# Patient Record
Sex: MALE | Race: WHITE | NOT HISPANIC OR LATINO
[De-identification: names, ages, dates, MRNs, and addresses within clinical notes are randomized per-mention and may not be internally consistent; named-entity substitution may affect disease eponyms.]

---

## 2018-08-28 ENCOUNTER — RECORD ABSTRACTING (OUTPATIENT)
Age: 68
End: 2018-08-28

## 2018-08-28 DIAGNOSIS — Z95.0 PRESENCE OF CARDIAC PACEMAKER: ICD-10-CM

## 2018-08-28 DIAGNOSIS — Z87.891 PERSONAL HISTORY OF NICOTINE DEPENDENCE: ICD-10-CM

## 2018-10-02 ENCOUNTER — APPOINTMENT (OUTPATIENT)
Dept: PULMONOLOGY | Facility: CLINIC | Age: 68
End: 2018-10-02
Payer: MEDICARE

## 2018-10-02 VITALS
DIASTOLIC BLOOD PRESSURE: 71 MMHG | TEMPERATURE: 97.7 F | HEART RATE: 76 BPM | OXYGEN SATURATION: 96 % | SYSTOLIC BLOOD PRESSURE: 148 MMHG

## 2018-10-02 LAB — GLUCOSE BLDC GLUCOMTR-MCNC: 338

## 2018-10-02 PROCEDURE — 99213 OFFICE O/P EST LOW 20 MIN: CPT | Mod: 25

## 2018-10-02 PROCEDURE — 90662 IIV NO PRSV INCREASED AG IM: CPT

## 2018-10-02 PROCEDURE — 82962 GLUCOSE BLOOD TEST: CPT

## 2018-10-02 PROCEDURE — 83036 HEMOGLOBIN GLYCOSYLATED A1C: CPT | Mod: QW

## 2018-10-02 PROCEDURE — G0008: CPT

## 2018-10-03 LAB — HBA1C MFR BLD HPLC: 9.7

## 2018-10-12 ENCOUNTER — LABORATORY RESULT (OUTPATIENT)
Age: 68
End: 2018-10-12

## 2018-10-12 ENCOUNTER — APPOINTMENT (OUTPATIENT)
Dept: PULMONOLOGY | Facility: CLINIC | Age: 68
End: 2018-10-12
Payer: MEDICARE

## 2018-10-12 VITALS
SYSTOLIC BLOOD PRESSURE: 152 MMHG | OXYGEN SATURATION: 97 % | HEART RATE: 64 BPM | TEMPERATURE: 99 F | DIASTOLIC BLOOD PRESSURE: 71 MMHG | RESPIRATION RATE: 12 BRPM

## 2018-10-12 VITALS — WEIGHT: 292 LBS | HEIGHT: 71 IN | BODY MASS INDEX: 40.88 KG/M2

## 2018-10-12 DIAGNOSIS — L25.9 UNSPECIFIED CONTACT DERMATITIS, UNSPECIFIED CAUSE: ICD-10-CM

## 2018-10-12 PROCEDURE — 99215 OFFICE O/P EST HI 40 MIN: CPT | Mod: 25

## 2018-10-12 PROCEDURE — 36415 COLL VENOUS BLD VENIPUNCTURE: CPT

## 2018-10-15 LAB
25(OH)D3 SERPL-MCNC: 34.4 NG/ML
ALBUMIN SERPL ELPH-MCNC: 4.3 G/DL
ALP BLD-CCNC: 80 U/L
ALT SERPL-CCNC: 34 U/L
ANION GAP SERPL CALC-SCNC: 14 MMOL/L
AST SERPL-CCNC: 32 U/L
BASOPHILS # BLD AUTO: 0.02 K/UL
BASOPHILS NFR BLD AUTO: 0.3 %
BILIRUB DIRECT SERPL-MCNC: 0.1 MG/DL
BILIRUB INDIRECT SERPL-MCNC: 0.3 MG/DL
BILIRUB SERPL-MCNC: 0.4 MG/DL
BUN SERPL-MCNC: 20 MG/DL
CALCIUM SERPL-MCNC: 9.7 MG/DL
CHLORIDE SERPL-SCNC: 101 MMOL/L
CHOLEST SERPL-MCNC: 140 MG/DL
CHOLEST/HDLC SERPL: 4 RATIO
CO2 SERPL-SCNC: 25 MMOL/L
CREAT SERPL-MCNC: 1.01 MG/DL
EOSINOPHIL # BLD AUTO: 0.24 K/UL
EOSINOPHIL NFR BLD AUTO: 3.7 %
GLUCOSE SERPL-MCNC: 180 MG/DL
HCT VFR BLD CALC: 40.2 %
HCV AB SER QL: NONREACTIVE
HCV S/CO RATIO: 0.11 S/CO
HDLC SERPL-MCNC: 35 MG/DL
HGB BLD-MCNC: 13.3 G/DL
IMM GRANULOCYTES NFR BLD AUTO: 0.5 %
LDLC SERPL CALC-MCNC: 64 MG/DL
LYMPHOCYTES # BLD AUTO: 2.29 K/UL
LYMPHOCYTES NFR BLD AUTO: 35.6 %
MAN DIFF?: NORMAL
MCHC RBC-ENTMCNC: 29.1 PG
MCHC RBC-ENTMCNC: 33.1 GM/DL
MCV RBC AUTO: 88 FL
MONOCYTES # BLD AUTO: 0.44 K/UL
MONOCYTES NFR BLD AUTO: 6.8 %
NEUTROPHILS # BLD AUTO: 3.41 K/UL
NEUTROPHILS NFR BLD AUTO: 53.1 %
PLATELET # BLD AUTO: 209 K/UL
POTASSIUM SERPL-SCNC: 4.4 MMOL/L
PROT SERPL-MCNC: 7 G/DL
RBC # BLD: 4.57 M/UL
RBC # FLD: 14.6 %
SODIUM SERPL-SCNC: 140 MMOL/L
T3 SERPL-MCNC: 111 NG/DL
T3RU NFR SERPL: 1.01 INDEX
T4 FREE SERPL-MCNC: 1.3 NG/DL
T4 SERPL-MCNC: 7.5 UG/DL
TRIGL SERPL-MCNC: 203 MG/DL
TSH SERPL-ACNC: 2.08 UIU/ML
WBC # FLD AUTO: 6.43 K/UL

## 2018-11-10 ENCOUNTER — EMERGENCY (EMERGENCY)
Facility: HOSPITAL | Age: 68
LOS: 1 days | Discharge: HOME | End: 2018-11-10
Attending: EMERGENCY MEDICINE | Admitting: EMERGENCY MEDICINE
Payer: MEDICARE

## 2018-11-10 VITALS
DIASTOLIC BLOOD PRESSURE: 78 MMHG | RESPIRATION RATE: 16 BRPM | TEMPERATURE: 99 F | OXYGEN SATURATION: 95 % | HEART RATE: 82 BPM | SYSTOLIC BLOOD PRESSURE: 160 MMHG

## 2018-11-10 VITALS
SYSTOLIC BLOOD PRESSURE: 143 MMHG | OXYGEN SATURATION: 98 % | HEART RATE: 81 BPM | RESPIRATION RATE: 22 BRPM | DIASTOLIC BLOOD PRESSURE: 95 MMHG

## 2018-11-10 DIAGNOSIS — Z98.89 OTHER SPECIFIED POSTPROCEDURAL STATES: Chronic | ICD-10-CM

## 2018-11-10 LAB
ALBUMIN SERPL ELPH-MCNC: 4.4 G/DL — SIGNIFICANT CHANGE UP (ref 3.3–5)
ALP SERPL-CCNC: 89 U/L — SIGNIFICANT CHANGE UP (ref 40–120)
ALT FLD-CCNC: 32 U/L — SIGNIFICANT CHANGE UP (ref 4–41)
AST SERPL-CCNC: 29 U/L — SIGNIFICANT CHANGE UP (ref 4–40)
BASOPHILS # BLD AUTO: 0.05 K/UL — SIGNIFICANT CHANGE UP (ref 0–0.2)
BASOPHILS NFR BLD AUTO: 0.6 % — SIGNIFICANT CHANGE UP (ref 0–2)
BILIRUB SERPL-MCNC: 0.4 MG/DL — SIGNIFICANT CHANGE UP (ref 0.2–1.2)
BUN SERPL-MCNC: 20 MG/DL — SIGNIFICANT CHANGE UP (ref 7–23)
CALCIUM SERPL-MCNC: 9.6 MG/DL — SIGNIFICANT CHANGE UP (ref 8.4–10.5)
CHLORIDE SERPL-SCNC: 100 MMOL/L — SIGNIFICANT CHANGE UP (ref 98–107)
CO2 SERPL-SCNC: 25 MMOL/L — SIGNIFICANT CHANGE UP (ref 22–31)
CREAT SERPL-MCNC: 1.09 MG/DL — SIGNIFICANT CHANGE UP (ref 0.5–1.3)
EOSINOPHIL # BLD AUTO: 0.24 K/UL — SIGNIFICANT CHANGE UP (ref 0–0.5)
EOSINOPHIL NFR BLD AUTO: 3 % — SIGNIFICANT CHANGE UP (ref 0–6)
GLUCOSE SERPL-MCNC: 162 MG/DL — HIGH (ref 70–99)
HCT VFR BLD CALC: 44.7 % — SIGNIFICANT CHANGE UP (ref 39–50)
HGB BLD-MCNC: 14.8 G/DL — SIGNIFICANT CHANGE UP (ref 13–17)
IMM GRANULOCYTES # BLD AUTO: 0.03 # — SIGNIFICANT CHANGE UP
IMM GRANULOCYTES NFR BLD AUTO: 0.4 % — SIGNIFICANT CHANGE UP (ref 0–1.5)
LYMPHOCYTES # BLD AUTO: 3.21 K/UL — SIGNIFICANT CHANGE UP (ref 1–3.3)
LYMPHOCYTES # BLD AUTO: 40.4 % — SIGNIFICANT CHANGE UP (ref 13–44)
MCHC RBC-ENTMCNC: 29.1 PG — SIGNIFICANT CHANGE UP (ref 27–34)
MCHC RBC-ENTMCNC: 33.1 % — SIGNIFICANT CHANGE UP (ref 32–36)
MCV RBC AUTO: 88 FL — SIGNIFICANT CHANGE UP (ref 80–100)
MONOCYTES # BLD AUTO: 0.53 K/UL — SIGNIFICANT CHANGE UP (ref 0–0.9)
MONOCYTES NFR BLD AUTO: 6.7 % — SIGNIFICANT CHANGE UP (ref 2–14)
NEUTROPHILS # BLD AUTO: 3.89 K/UL — SIGNIFICANT CHANGE UP (ref 1.8–7.4)
NEUTROPHILS NFR BLD AUTO: 48.9 % — SIGNIFICANT CHANGE UP (ref 43–77)
NRBC # FLD: 0 — SIGNIFICANT CHANGE UP
PLATELET # BLD AUTO: 239 K/UL — SIGNIFICANT CHANGE UP (ref 150–400)
PMV BLD: 10.9 FL — SIGNIFICANT CHANGE UP (ref 7–13)
POTASSIUM SERPL-MCNC: 3.8 MMOL/L — SIGNIFICANT CHANGE UP (ref 3.5–5.3)
POTASSIUM SERPL-SCNC: 3.8 MMOL/L — SIGNIFICANT CHANGE UP (ref 3.5–5.3)
PROT SERPL-MCNC: 7.8 G/DL — SIGNIFICANT CHANGE UP (ref 6–8.3)
RBC # BLD: 5.08 M/UL — SIGNIFICANT CHANGE UP (ref 4.2–5.8)
RBC # FLD: 13.9 % — SIGNIFICANT CHANGE UP (ref 10.3–14.5)
SODIUM SERPL-SCNC: 142 MMOL/L — SIGNIFICANT CHANGE UP (ref 135–145)
WBC # BLD: 7.95 K/UL — SIGNIFICANT CHANGE UP (ref 3.8–10.5)
WBC # FLD AUTO: 7.95 K/UL — SIGNIFICANT CHANGE UP (ref 3.8–10.5)

## 2018-11-10 PROCEDURE — 99220: CPT

## 2018-11-10 RX ORDER — DIPHENHYDRAMINE HCL 50 MG
50 CAPSULE ORAL ONCE
Qty: 0 | Refills: 0 | Status: COMPLETED | OUTPATIENT
Start: 2018-11-10 | End: 2018-11-10

## 2018-11-10 RX ORDER — EPINEPHRINE 0.3 MG/.3ML
0.3 INJECTION INTRAMUSCULAR; SUBCUTANEOUS ONCE
Qty: 0 | Refills: 0 | Status: COMPLETED | OUTPATIENT
Start: 2018-11-10 | End: 2018-11-10

## 2018-11-10 RX ORDER — EPINEPHRINE 0.3 MG/.3ML
0.3 INJECTION INTRAMUSCULAR; SUBCUTANEOUS
Qty: 0.6 | Refills: 0
Start: 2018-11-10 | End: 2018-11-10

## 2018-11-10 RX ORDER — FAMOTIDINE 10 MG/ML
20 INJECTION INTRAVENOUS ONCE
Qty: 0 | Refills: 0 | Status: COMPLETED | OUTPATIENT
Start: 2018-11-10 | End: 2018-11-10

## 2018-11-10 RX ADMIN — FAMOTIDINE 20 MILLIGRAM(S): 10 INJECTION INTRAVENOUS at 06:32

## 2018-11-10 RX ADMIN — Medication 125 MILLIGRAM(S): at 06:25

## 2018-11-10 RX ADMIN — EPINEPHRINE 0.3 MILLIGRAM(S): 0.3 INJECTION INTRAMUSCULAR; SUBCUTANEOUS at 06:30

## 2018-11-10 RX ADMIN — Medication 50 MILLIGRAM(S): at 06:25

## 2018-11-10 NOTE — CONSULT NOTE ADULT - SUBJECTIVE AND OBJECTIVE BOX
HPI  68M h/o HTN, DM, RADHA presenting to the ED with tongue swelling. Patient reports symptoms started about 1h prior to presentation. He feels like he is unable to breath through his mouth but has no difficulty breathing through his nose. Denies SOB or feeling like his throat is closing. Voice is muffled. Tolerating secretions. No pain. This has never happened before. Was just started on an ARB/HCTZ combination a couple months ago. Feels improves after getting the epi and angioedema cocktail.     Past Medical and Surgical History:  Sleep apnea  Obesity  Syncope  Hypertension  Diabetes  S/P knee surgery    Medications  MEDICATIONS  (STANDING):    MEDICATIONS  (PRN):    Allergies  No Known Drug Allergies  Unknown Allergen - Anaphylaxis (Anaphylaxis (Severe))    Review of Systems  General: Negative except for HPI  Neurological: Negative.  Opthalmologic: Negative.  ENT: Negative except for HPI.  Respiratory: Negative.    Cardiovascular: Negative.    Gastrointestinal: Negative.    Genitourinary: Negative.    Musculoskeletal: Negative.    Dermatologic: Negative.    Endocrinology: Negative.    Hematological: Negative.    Psychiatric: Negative.      Vital signs past 24h  T(F): 98.6 (10 Nov 2018 08:33), Max: 98.6 (10 Nov 2018 08:33)  HR: 87 (10 Nov 2018 08:33) (81 - 96)  BP: 148/53 (10 Nov 2018 08:33) (143/95 - 165/75)  BP(mean): --  RR: 20 (10 Nov 2018 08:33) (20 - 22)  SpO2: 97% (10 Nov 2018 08:33) (97% - 98%)    Physical Exam  Constitutional: NAD, alert, awake  HENT:         Head: Normocephalic, atraumatic       Face: Symmetric, no lesion or mass       Nose: No external deformity, clear anteriorly       Oral cavity and oropharynx: oral tongue and FOM with severe watery edema, unable to visualize the OP, no pooling of secretions, no trismus        Voice: Muffled        Neck: Submental fullness, no TTP, trachea midline   Eyes: EOMI  Pulmonary/Chest: Breathing comfortably on room air, no stridor or stertor  Abdominal: Non-distended  Genitourinary: Not indicated  Neurological: No focal neuro deficit, CN 2-12 grossly intact  Skin: No obvious lesion or rash  Musculoskeletal: No deformities noted, full ROM in all major joints    Psychiatric: Alert, appropriate responses and attention span     Flexible Laryngoscopy  NC: no turbinate hypertrophy, no copious secretions  OC/OP: palate, uvula, tonsils, vallecula, base of tongue, posterior pharyngeal wall wnl  Supraglottis: epiglottis, AE folds, arytenoids, false vocal cords wnl  Hypopharynx: pyriform sinuses, posterior pharyngeal wall, post-cricoid area wnl  Glottis: true vocal cords with normal mobility and no lesion, edema or erythema  Subglottis: appears patent    Labs                        14.8   7.95  )-----------( 239      ( 10 Nov 2018 06:15 )             44.7     11-10    142  |  100  |  20  ----------------------------<  162<H>  3.8   |  25  |  1.09    Ca    9.6      10 Nov 2018 06:15    TPro  7.8  /  Alb  4.4  /  TBili  0.4  /  DBili  x   /  AST  29  /  ALT  32  /  AlkPhos  89  11-10    Assessment and Plan  68M with severe tongue and FOM angioedema likely 2/2 ARB, no oropharyngeal or laryngeal involvement.   -decadron 10mg q8h, benadryl 50mg IV q8h, pepcid 20mg IV q12h and redose as long as patient still has edema  -continuous pulse ox  -NPO as long as the swelling remains severe; once the swelling becomes mild may start advancing PO as tolerated   -STOP/NEVER take any ARB or ACEI from now on  -document allergies in chart including ARB/ACEI  -follow-up with PCP regarding htn management   -consider outpatient allergy follow-up  -page ORL and anesthesia STAT for airway concerns, plan for possible need for awake fiberoptic intubation if patient were to decompensate  -no need to rescope unless patient worsens  -CDU for further monitoring; if patient is admitted he will need to be admitted to medicine with continuous pulse ox  -Dr. Pisano will come see

## 2018-11-10 NOTE — ED CDU PROVIDER INITIAL DAY NOTE - MEDICAL DECISION MAKING DETAILS
Pt presented to ED with angioedema, was given benadryl, epinephrine, pepcid and steroids with improvement of symptoms. ENT performed a bedside scope, no laryngeal involvement, in CDU for observation, pt has been stable.

## 2018-11-10 NOTE — ED ADULT NURSE REASSESSMENT NOTE - NS ED NURSE REASSESS COMMENT FT1
report given to Janette RIOS in CDU at this time. Pt to be taken to CDU for further eval. No signs of respiratory distress noted. Speech remains garbled but able to make needs known. Pt states feels symptoms are improving.

## 2018-11-10 NOTE — ED CDU PROVIDER DISPOSITION NOTE - NSFOLLOWUPINSTRUCTIONS_ED_ALL_ED_FT
You have been cleared for discharge home. Please follow up with your primary care physician in the next 24-28 hours, unless otherwise directed. If your symptoms worsen in severity, please return to your nearest Emergency Department. Make sure to stop your combination blood pressure pill and take hydrochlorothiazide instead. Your PMD will assess your blood pressure medication regimen.

## 2018-11-10 NOTE — ED ADULT NURSE NOTE - CHPI ED NUR SYMPTOMS NEG
no throat itching/no nausea/no vomiting/no difficulty breathing/no rash/no difficulty swallowing/no shortness of breath/no wheezing

## 2018-11-10 NOTE — ED PROVIDER NOTE - MEDICAL DECISION MAKING DETAILS
69 y/o M with tongue swelling, no drooling or stridor at this time and airway appears intact but tongue swelling limits eval of post oropharynx.  ENT to assess airway, benadryl/pepcid/solumedrol/epi, reassess.

## 2018-11-10 NOTE — ED CDU PROVIDER INITIAL DAY NOTE - ATTENDING CONTRIBUTION TO CARE
AJM: Patient seen with PA and agree with above note. 69 y/o M with h/o HTN, DM (recently started on ARB/HCTZ combo and janumet 2 month ago) here with tongue swelling.  Pt reports sxs began about 1 hour PTA.  Pt went to bed in usual state of health, awoken by coughing and tongue swelling.  No stridor, drooling, or difficulty in  breathing.  No abd pain, n/v, rash, other swelling.  No new foods, allergens.  No h/o similar rxn in past.    In ED, he was given Epinephrine, Pepcid, Benadryl and Solumedrol with great improvement and has been resting comfortably afterward, no other symptoms or complaints. Pt notes is able to speak more clearly and tongue has decreased in size though still slightly swollen. Symptoms likely due to ARB use. pt instructed to stop this medicine. will continue to observe in CDU. re-scope by ENT. likely dc home

## 2018-11-10 NOTE — ED PROVIDER NOTE - OBJECTIVE STATEMENT
69 y/o M with h/o HTN, DM (recently started on ARB/HCTZ combo and janumet 2 month ago) here with tongue swelling.  Pt reports sxs began about 1 hour PTA.  Pt went to bed in usual state of health, awoken by coughing and tongue swelling.  No stridor, drooling, or difficulty in  breathing.  No abd pain, n/v, rash, other swelling.  No new foods, allergens.  No h/o similar rxn in past.

## 2018-11-10 NOTE — ED CDU PROVIDER DISPOSITION NOTE - CLINICAL COURSE
Patient seen with PA and agree with above note. 69 y/o M with h/o HTN, DM (recently started on ARB/HCTZ combo and janumet 2 month ago) here with tongue swelling.  Pt reports sxs began about 1 hour PTA.  Pt went to bed in usual state of health, awoken by coughing and tongue swelling.  No stridor, drooling, or difficulty in  breathing.  No abd pain, n/v, rash, other swelling.  No new foods, allergens.  No h/o similar rxn in past.    In ED, he was given Epinephrine, Pepcid, Benadryl and Solumedrol with great improvement and has been resting comfortably afterward, no other symptoms or complaints. Pt notes is able to speak more clearly and tongue has decreased in size though still slightly swollen. Symptoms likely due to ARB use. pt instructed to stop this medicine. Pt continued to improve will in CDU. scoped again by Dr. Pisano. New for [place] home with pmd followup

## 2018-11-10 NOTE — ED PROVIDER NOTE - MOUTH
no ulcers/(+)significant tongue swelling, no sublingual woody edema, no appreciable drooling or stridor, (+)hard palate visible, but rest of post oropharynx not visible on exam

## 2018-11-10 NOTE — ED ADULT NURSE NOTE - OBJECTIVE STATEMENT
Pt brought to rm 4 with history of DM, HTN, and pacemaker. Pt states he woke up a few hours ago with cough and felt tongue swelling. Pt denies any new foods, environmental changes, or allergies. Pt states he started taking new medications about 2 mos ago.  Pt denies SOB, difficulty breathing, difficulty swallowing, chest pain.  No signs of respiratory distress noted. Respirations are even and unlabored. pt able to speak full sentences. Pt tongue noted to be swollen and partially sticking out of month.  20G IV inserted into RAC. labs sent as ordered.  Will continue to monitor

## 2018-11-10 NOTE — ED ADULT TRIAGE NOTE - CHIEF COMPLAINT QUOTE
Pt arrives c/o tongue swelling - family helping to provide history as pt having difficulty speaking.  Per family symptoms began ~1hour ago, went to bed feeling fine.  No hives/pruritic rash noted, states throat is not swollen, just tongue.  Denies similar prior reactions, no known allergies.  PMHx Obesity, HTN, DM.  Rpts new medications: Irbesartan/Hctz & Janumet x2 months.  Charge RN Notified, brought to Rm 4.

## 2018-11-10 NOTE — ED CDU PROVIDER INITIAL DAY NOTE - OBJECTIVE STATEMENT
69 y/o M with h/o HTN, DM (recently started on ARB/HCTZ combo and janumet 2 month ago) here with tongue swelling.  Pt reports sxs began about 1 hour PTA.  Pt went to bed in usual state of health, awoken by coughing and tongue swelling.  No stridor, drooling, or difficulty in  breathing.  No abd pain, n/v, rash, other swelling.  No new foods, allergens.  No h/o similar rxn in past.    CDU IRENE Gibbs: 69 Y/O M PMH HTN DM recently started on ARB/HCTZ combo developed tongue swelling late last night which woke him up late last night. He was given Epinephrine, Pepcid, Benadryl and Solumedrol with great improvement and has been resting comfortably afterward, no other symptoms or complaints.

## 2018-11-10 NOTE — ED ADULT NURSE REASSESSMENT NOTE - NS ED NURSE REASSESS COMMENT FT1
On reassessment pt states he feels as if swelling is decreasing. Pt objectively does not appear to have any decrease in swelling. Pt still denies any difficulty breathing/swallowing or sob,. airway remains patent

## 2018-11-10 NOTE — ED PROVIDER NOTE - PROGRESS NOTE DETAILS
Bay FRASER: Pt evaluated by ENT - other than impressive tongue swelling, there is no edema to postoropharynx, no laryngeal edema.  Pt reports some improvement with medications.  Will obs for 6 hours, ENT to reassess.

## 2018-11-10 NOTE — ED CDU PROVIDER INITIAL DAY NOTE - PROGRESS NOTE DETAILS
AJM: pt significantly improved. re-eval by Dr. roger. pt cleared for dc home. dc with instructions to stop arb and follow up with pmd. rx for hctz sent

## 2018-11-10 NOTE — ED ADULT NURSE REASSESSMENT NOTE - NS ED NURSE REASSESS COMMENT FT1
Report received from BLANCA Morales. Angioedema still noted, unable to visualize airway. Pt denies dyspnea and other acute complaints. VSS. Call bell at bedside within reach. Wife at bedside.

## 2018-12-13 ENCOUNTER — MEDICATION RENEWAL (OUTPATIENT)
Age: 68
End: 2018-12-13

## 2018-12-24 ENCOUNTER — MEDICATION RENEWAL (OUTPATIENT)
Age: 68
End: 2018-12-24

## 2019-01-22 ENCOUNTER — APPOINTMENT (OUTPATIENT)
Dept: PULMONOLOGY | Facility: CLINIC | Age: 69
End: 2019-01-22
Payer: MEDICARE

## 2019-01-22 VITALS
RESPIRATION RATE: 12 BRPM | DIASTOLIC BLOOD PRESSURE: 70 MMHG | HEART RATE: 68 BPM | TEMPERATURE: 99.7 F | SYSTOLIC BLOOD PRESSURE: 158 MMHG | OXYGEN SATURATION: 97 %

## 2019-01-22 LAB — HBA1C MFR BLD HPLC: 8.1

## 2019-01-22 PROCEDURE — 36415 COLL VENOUS BLD VENIPUNCTURE: CPT

## 2019-01-22 PROCEDURE — 99213 OFFICE O/P EST LOW 20 MIN: CPT | Mod: 25

## 2019-01-22 PROCEDURE — 83036 HEMOGLOBIN GLYCOSYLATED A1C: CPT | Mod: QW

## 2019-01-22 NOTE — PHYSICAL EXAM
[General Appearance - Well Developed] : well developed [General Appearance - Well Nourished] : well nourished [Normal Conjunctiva] : the conjunctiva exhibited no abnormalities [Normal Oropharynx] : normal oropharynx [Jugular Venous Distention Increased] : there was no jugular-venous distention [Heart Sounds] : normal S1 and S2 [Murmurs] : no murmurs present [Auscultation Breath Sounds / Voice Sounds] : lungs were clear to auscultation bilaterally [Lungs Percussion] : the lungs were normal to percussion [Abdomen Soft] : soft [Abdomen Tenderness] : non-tender [Abdomen Mass (___ Cm)] : no abdominal mass palpated [Nail Clubbing] : no clubbing of the fingernails [Cyanosis, Localized] : no localized cyanosis [Petechial Hemorrhages (___cm)] : no petechial hemorrhages [] : no ischemic changes [FreeTextEntry2] : Pulses 1-2 plus [FreeTextEntry1] : Improved dermatitis.

## 2019-01-22 NOTE — PROCEDURE
[FreeTextEntry1] : hem aic 8.1 down from 9.7. still elevated\par \par BP elevated poor compliance to meds.

## 2019-01-22 NOTE — HISTORY OF PRESENT ILLNESS
[FreeTextEntry1] : Doing well on CPAP.\par \par Diet so/so.\par \par had endo changed medication for sugar, cant remember name\par

## 2019-01-25 LAB
ALBUMIN SERPL ELPH-MCNC: 4.3 G/DL
ALP BLD-CCNC: 85 U/L
ALT SERPL-CCNC: 36 U/L
ANION GAP SERPL CALC-SCNC: 13 MMOL/L
AST SERPL-CCNC: 28 U/L
BILIRUB SERPL-MCNC: 0.3 MG/DL
BUN SERPL-MCNC: 22 MG/DL
CALCIUM SERPL-MCNC: 10.3 MG/DL
CHLORIDE SERPL-SCNC: 97 MMOL/L
CHOLEST SERPL-MCNC: 159 MG/DL
CHOLEST/HDLC SERPL: 4.3 RATIO
CO2 SERPL-SCNC: 28 MMOL/L
CREAT SERPL-MCNC: 1.05 MG/DL
GLUCOSE SERPL-MCNC: 167 MG/DL
HDLC SERPL-MCNC: 37 MG/DL
LDLC SERPL CALC-MCNC: 79 MG/DL
POTASSIUM SERPL-SCNC: 4.2 MMOL/L
PROT SERPL-MCNC: 7.9 G/DL
SODIUM SERPL-SCNC: 138 MMOL/L
TRIGL SERPL-MCNC: 216 MG/DL

## 2019-04-16 ENCOUNTER — APPOINTMENT (OUTPATIENT)
Dept: PULMONOLOGY | Facility: CLINIC | Age: 69
End: 2019-04-16
Payer: MEDICARE

## 2019-04-16 VITALS
WEIGHT: 290 LBS | HEIGHT: 71 IN | DIASTOLIC BLOOD PRESSURE: 72 MMHG | OXYGEN SATURATION: 98 % | SYSTOLIC BLOOD PRESSURE: 158 MMHG | BODY MASS INDEX: 40.6 KG/M2 | HEART RATE: 68 BPM | TEMPERATURE: 98.4 F | RESPIRATION RATE: 16 BRPM

## 2019-04-16 VITALS — DIASTOLIC BLOOD PRESSURE: 76 MMHG | SYSTOLIC BLOOD PRESSURE: 136 MMHG

## 2019-04-16 PROCEDURE — 99213 OFFICE O/P EST LOW 20 MIN: CPT

## 2019-04-16 NOTE — ASSESSMENT
[FreeTextEntry1] : Cont cpap\par Bring chip\par Weight loss recommended and discussed.\par Medications reviewed and renewed.\par

## 2019-04-16 NOTE — HISTORY OF PRESENT ILLNESS
[FreeTextEntry1] : saw endo hem aic better\par \par saw cardiologist and added Bystolic \par \par using cpap nightly and machine is going to be 5 years old

## 2019-04-16 NOTE — PHYSICAL EXAM
[General Appearance - Well Nourished] : well nourished [General Appearance - Well Developed] : well developed [Normal Conjunctiva] : the conjunctiva exhibited no abnormalities [Normal Oropharynx] : normal oropharynx [Jugular Venous Distention Increased] : there was no jugular-venous distention [Murmurs] : no murmurs present [Heart Sounds] : normal S1 and S2 [Lungs Percussion] : the lungs were normal to percussion [Auscultation Breath Sounds / Voice Sounds] : lungs were clear to auscultation bilaterally [Abdomen Soft] : soft [Abdomen Tenderness] : non-tender [Abdomen Mass (___ Cm)] : no abdominal mass palpated [Cyanosis, Localized] : no localized cyanosis [Petechial Hemorrhages (___cm)] : no petechial hemorrhages [Nail Clubbing] : no clubbing of the fingernails [] : no ischemic changes [FreeTextEntry2] : Pulses 1-2 plus

## 2019-04-18 ENCOUNTER — MEDICATION RENEWAL (OUTPATIENT)
Age: 69
End: 2019-04-18

## 2019-06-10 ENCOUNTER — APPOINTMENT (OUTPATIENT)
Dept: PULMONOLOGY | Facility: CLINIC | Age: 69
End: 2019-06-10
Payer: MEDICARE

## 2019-06-10 PROCEDURE — 95800 SLP STDY UNATTENDED: CPT | Mod: 52

## 2019-06-11 PROCEDURE — 95800 SLP STDY UNATTENDED: CPT | Mod: 52

## 2019-06-22 ENCOUNTER — EMERGENCY (EMERGENCY)
Facility: HOSPITAL | Age: 69
LOS: 1 days | Discharge: HOME | End: 2019-06-22
Attending: EMERGENCY MEDICINE | Admitting: EMERGENCY MEDICINE
Payer: MEDICARE

## 2019-06-22 VITALS
OXYGEN SATURATION: 100 % | RESPIRATION RATE: 18 BRPM | DIASTOLIC BLOOD PRESSURE: 71 MMHG | HEART RATE: 103 BPM | SYSTOLIC BLOOD PRESSURE: 165 MMHG

## 2019-06-22 DIAGNOSIS — Z98.89 OTHER SPECIFIED POSTPROCEDURAL STATES: Chronic | ICD-10-CM

## 2019-06-22 LAB
ALBUMIN SERPL ELPH-MCNC: 4.7 G/DL — SIGNIFICANT CHANGE UP (ref 3.3–5)
ALP SERPL-CCNC: 81 U/L — SIGNIFICANT CHANGE UP (ref 40–120)
ALT FLD-CCNC: 42 U/L — HIGH (ref 4–41)
ANION GAP SERPL CALC-SCNC: 14 MMO/L — SIGNIFICANT CHANGE UP (ref 7–14)
AST SERPL-CCNC: 33 U/L — SIGNIFICANT CHANGE UP (ref 4–40)
BASOPHILS # BLD AUTO: 0.05 K/UL — SIGNIFICANT CHANGE UP (ref 0–0.2)
BASOPHILS NFR BLD AUTO: 0.6 % — SIGNIFICANT CHANGE UP (ref 0–2)
BILIRUB SERPL-MCNC: 0.3 MG/DL — SIGNIFICANT CHANGE UP (ref 0.2–1.2)
BUN SERPL-MCNC: 23 MG/DL — SIGNIFICANT CHANGE UP (ref 7–23)
CALCIUM SERPL-MCNC: 10 MG/DL — SIGNIFICANT CHANGE UP (ref 8.4–10.5)
CHLORIDE SERPL-SCNC: 105 MMOL/L — SIGNIFICANT CHANGE UP (ref 98–107)
CO2 SERPL-SCNC: 25 MMOL/L — SIGNIFICANT CHANGE UP (ref 22–31)
CREAT SERPL-MCNC: 1.06 MG/DL — SIGNIFICANT CHANGE UP (ref 0.5–1.3)
EOSINOPHIL # BLD AUTO: 0.29 K/UL — SIGNIFICANT CHANGE UP (ref 0–0.5)
EOSINOPHIL NFR BLD AUTO: 3.4 % — SIGNIFICANT CHANGE UP (ref 0–6)
GLUCOSE SERPL-MCNC: 192 MG/DL — HIGH (ref 70–99)
HCT VFR BLD CALC: 42.1 % — SIGNIFICANT CHANGE UP (ref 39–50)
HGB BLD-MCNC: 14 G/DL — SIGNIFICANT CHANGE UP (ref 13–17)
IMM GRANULOCYTES NFR BLD AUTO: 0.7 % — SIGNIFICANT CHANGE UP (ref 0–1.5)
LYMPHOCYTES # BLD AUTO: 2.45 K/UL — SIGNIFICANT CHANGE UP (ref 1–3.3)
LYMPHOCYTES # BLD AUTO: 29.1 % — SIGNIFICANT CHANGE UP (ref 13–44)
MCHC RBC-ENTMCNC: 28.3 PG — SIGNIFICANT CHANGE UP (ref 27–34)
MCHC RBC-ENTMCNC: 33.3 % — SIGNIFICANT CHANGE UP (ref 32–36)
MCV RBC AUTO: 85.1 FL — SIGNIFICANT CHANGE UP (ref 80–100)
MONOCYTES # BLD AUTO: 0.6 K/UL — SIGNIFICANT CHANGE UP (ref 0–0.9)
MONOCYTES NFR BLD AUTO: 7.1 % — SIGNIFICANT CHANGE UP (ref 2–14)
NEUTROPHILS # BLD AUTO: 4.97 K/UL — SIGNIFICANT CHANGE UP (ref 1.8–7.4)
NEUTROPHILS NFR BLD AUTO: 59.1 % — SIGNIFICANT CHANGE UP (ref 43–77)
NRBC # FLD: 0 K/UL — SIGNIFICANT CHANGE UP (ref 0–0)
PLATELET # BLD AUTO: 216 K/UL — SIGNIFICANT CHANGE UP (ref 150–400)
PMV BLD: 10.1 FL — SIGNIFICANT CHANGE UP (ref 7–13)
POTASSIUM SERPL-MCNC: 4 MMOL/L — SIGNIFICANT CHANGE UP (ref 3.5–5.3)
POTASSIUM SERPL-SCNC: 4 MMOL/L — SIGNIFICANT CHANGE UP (ref 3.5–5.3)
PROT SERPL-MCNC: 8.2 G/DL — SIGNIFICANT CHANGE UP (ref 6–8.3)
RBC # BLD: 4.95 M/UL — SIGNIFICANT CHANGE UP (ref 4.2–5.8)
RBC # FLD: 14.4 % — SIGNIFICANT CHANGE UP (ref 10.3–14.5)
SODIUM SERPL-SCNC: 144 MMOL/L — SIGNIFICANT CHANGE UP (ref 135–145)
WBC # BLD: 8.42 K/UL — SIGNIFICANT CHANGE UP (ref 3.8–10.5)
WBC # FLD AUTO: 8.42 K/UL — SIGNIFICANT CHANGE UP (ref 3.8–10.5)

## 2019-06-22 PROCEDURE — 99218: CPT | Mod: GC

## 2019-06-22 RX ORDER — EPINEPHRINE 0.3 MG/.3ML
0.3 INJECTION INTRAMUSCULAR; SUBCUTANEOUS ONCE
Refills: 0 | Status: DISCONTINUED | OUTPATIENT
Start: 2019-06-22 | End: 2019-06-22

## 2019-06-22 RX ORDER — DIPHENHYDRAMINE HCL 50 MG
38 CAPSULE ORAL ONCE
Refills: 0 | Status: COMPLETED | OUTPATIENT
Start: 2019-06-22 | End: 2019-06-22

## 2019-06-22 RX ORDER — FAMOTIDINE 10 MG/ML
20 INJECTION INTRAVENOUS ONCE
Refills: 0 | Status: COMPLETED | OUTPATIENT
Start: 2019-06-22 | End: 2019-06-22

## 2019-06-22 RX ORDER — SERTRALINE 25 MG/1
100 TABLET, FILM COATED ORAL DAILY
Refills: 0 | Status: DISCONTINUED | OUTPATIENT
Start: 2019-06-22 | End: 2019-06-26

## 2019-06-22 RX ORDER — FINASTERIDE 5 MG/1
5 TABLET, FILM COATED ORAL DAILY
Refills: 0 | Status: DISCONTINUED | OUTPATIENT
Start: 2019-06-22 | End: 2019-06-26

## 2019-06-22 RX ORDER — SODIUM CHLORIDE 9 MG/ML
1000 INJECTION INTRAMUSCULAR; INTRAVENOUS; SUBCUTANEOUS ONCE
Refills: 0 | Status: COMPLETED | OUTPATIENT
Start: 2019-06-22 | End: 2019-06-22

## 2019-06-22 RX ORDER — EPINEPHRINE 0.3 MG/.3ML
0.3 INJECTION INTRAMUSCULAR; SUBCUTANEOUS ONCE
Refills: 0 | Status: COMPLETED | OUTPATIENT
Start: 2019-06-22 | End: 2019-06-22

## 2019-06-22 RX ADMIN — Medication 125 MILLIGRAM(S): at 19:54

## 2019-06-22 RX ADMIN — EPINEPHRINE 0.3 MILLIGRAM(S): 0.3 INJECTION INTRAMUSCULAR; SUBCUTANEOUS at 21:54

## 2019-06-22 RX ADMIN — Medication 38 MILLIGRAM(S): at 19:56

## 2019-06-22 RX ADMIN — SODIUM CHLORIDE 2000 MILLILITER(S): 9 INJECTION INTRAMUSCULAR; INTRAVENOUS; SUBCUTANEOUS at 19:55

## 2019-06-22 RX ADMIN — FAMOTIDINE 20 MILLIGRAM(S): 10 INJECTION INTRAVENOUS at 19:54

## 2019-06-22 NOTE — ED PROVIDER NOTE - CLINICAL SUMMARY MEDICAL DECISION MAKING FREE TEXT BOX
angioedema vs anaphylaxis, no clear trigger or family hx, will treat with pepcid/benadryl/steroid/consider epi, hydrate, have ENT scope airway, likely CDU

## 2019-06-22 NOTE — ED ADULT NURSE NOTE - NSIMPLEMENTINTERV_GEN_ALL_ED
Implemented All Universal Safety Interventions:  [place] to call system. Call bell, personal items and telephone within reach. Instruct patient to call for assistance. Room bathroom lighting operational. Non-slip footwear when patient is off stretcher. Physically safe environment: no spills, clutter or unnecessary equipment. Stretcher in lowest position, wheels locked, appropriate side rails in place.

## 2019-06-22 NOTE — ED PROVIDER NOTE - PHYSICAL EXAMINATION
Gen: NAD, AOx3  Head: NCAT  HEENT: PERRL, oral mucosa moist, normal conjunctiva, left side tongue swelling, left side lip swelling, no posterior pharynx swelling visible  Lung: CTAB, no respiratory distress  CV: rrr, no murmurs, Normal perfusion  Abd: soft, NTND, no CVA tenderness  MSK: No edema, no visible deformities  Neuro: No focal neurologic deficits  Skin: No rash   Psych: normal affect

## 2019-06-22 NOTE — ED CDU PROVIDER INITIAL DAY NOTE - MEDICAL DECISION MAKING DETAILS
A:  -Lingual edema  P:  -ENT scope showed no upper airway edema beyond tongue, redose steroids q6h, redose benadryl q8h, redose pepcid q12h

## 2019-06-22 NOTE — CONSULT NOTE ADULT - SUBJECTIVE AND OBJECTIVE BOX
HPI: 69M with PMH anaphylaxis/angioedema, RADHA, DM, HTN presenting with 1-2 hours of tongue swelling after eating a chocolate bar today, No recent med changes. Had similar episode one year ago after changing BP med, but then switched back to his old med (ARB/chlorthalidone). Patient did not follow up with allergist or get epipen. No family history of angioedema. Does not have trouble breathing or difficulty swallowing. Muffled voice. On RA maintaining sats.    T(C): --  HR: 96 (06-22-19 @ 19:47) (96 - 103)  BP: 148/69 (06-22-19 @ 19:47) (148/69 - 165/71)  RR: 18 (06-22-19 @ 19:47) (18 - 18)  SpO2: 98% (06-22-19 @ 19:47) (98% - 100%)  NAD, AOx3  No respiratory distress, stridor, stertor on RA  Voice quality: muffled  PERRL, EOMI  Nose: bilateral NC clear anteriorly, IT normal, mucosa normal  OC/OP: tongue and FOM soft with moderate angiodema, no lesions, OP clear, minimal uvular edema  Neck: soft and flat, no LAD  CN II-XII grossly intact    FOE: NC/NP: bilateral IT and MT normal in appearance. mucosa normal, no lesions. NP clear  OP: BOT and tonsils normal, no lesions. Mucosa normal.  Suprglottis: Epiglottis, AE folds, Arytenoids all sharp without edema. Mucosa normal, no lesions.  Glottis: TVC fully mobile bilaterally, no lesions, airway grossly patent.  Subglottis: clear, no lesions  Hypopharynx: No pooling of secretions, mucosa normal, no lesions.    A/P: 69M with angioedema isolated to the tongue and FOM. No involvement of the airway at this time.  - no acute ENT intervention  - monitor tongue and FOM for resolution of swelling  - redose steroids q6h  - redose benadryl q8h  - redose pepcid q12h  - as needed until swelling resolves  - patient can be admitted to medicine service if swelling persists beyond observation period allowed in ED  - follow up with allergy outpatient  - discussed case with attending  - please page with questions

## 2019-06-22 NOTE — ED CDU PROVIDER INITIAL DAY NOTE - ATTENDING CONTRIBUTION TO CARE
Dr. Toro: I have personally seen and examined this patient at the bedside. I have fully participated in the care of this patient. I have reviewed all pertinent clinical information, including history, physical exam, plan and the Resident's note and agree except as noted. HPI above as by me. PE above as by me. DDX angioedema PLAN epi given, cdu for airway observation Dr. Toro: I performed a face to face bedside interview with patient regarding history of present illness, review of symptoms and past medical history. I completed an independent physical exam.  I have discussed patient's plan of care with PA.   I agree with note as stated above, having amended the EMR as needed to reflect my findings.   This includes HISTORY OF PRESENT ILLNESS, HIV, PAST MEDICAL/SURGICAL/FAMILY/SOCIAL HISTORY, ALLERGIES AND HOME MEDICATIONS, REVIEW OF SYSTEMS, PHYSICAL EXAM, and any PROGRESS NOTES during the time I functioned as the attending physician for this patient. HPI above as by me. PE above as by me. DDX angioedema PLAN epi given, cdu for airway observation

## 2019-06-22 NOTE — ED PROVIDER NOTE - OBJECTIVE STATEMENT
Patient is 69yM with PMH anaphylaxis/angioedema, jared, DM, htn presenting with 1-2 hours of left tongue, left lip, left neck swelling s/p eating a chocolate bar today, No recent med changes, No new detergents/soaps/meds/creams/foods/etc   Had similar episode in the past, after changing BP med, but then switched back to his old med (ARB/chlorthalidone) and is still on this, didn't follow up with allergist or get epipen  No family history of angioedema   Does not have trouble breathing or difficulty swallowing      ROS: Denies fever, palpitations, chills, recent sickness, HA, vision changes, cough, SOB, chest pain, abdominal pain, n/v/d/c, dysuria, hematuria, rash, new joint aches, sick contacts, and recent travel. Patient is 69yM with PMH anaphylaxis/angioedema, jared, DM, htn presenting with 1-2 hours of left tongue, left lip, left neck swelling s/p eating a chocolate bar today, No recent med changes, No new detergents/soaps/meds/creams/foods/etc. Had similar episode in the past, after changing BP med, but then switched back to . His old med (ARB/chlorthalidone) and is still on this, didn't follow up with allergist or get epipen. No family history of angioedema. Does not have trouble breathing or difficulty swallowing.    ROS: Denies fever, palpitations, chills, recent sickness, HA, vision changes, cough, SOB, chest pain, abdominal pain, n/v/d/c, dysuria, hematuria, rash, new joint aches, sick contacts, and recent travel. Patient is 69yM with PMH ppm, anaphylaxis/angioedema, jared, DM, htn presenting with 1-2 hours of left tongue, left lip, left neck swelling s/p eating a chocolate bar today, No recent med changes, No new detergents/soaps/meds/creams/foods/etc. Had similar episode in the past, after changing BP med, but then switched back to . His old med (ARB/chlorthalidone) and is still on this, didn't follow up with allergist or get epipen. No family history of angioedema. Does not have trouble breathing or difficulty swallowing.    ROS: Denies fever, palpitations, chills, recent sickness, HA, vision changes, cough, SOB, chest pain, abdominal pain, n/v/d/c, dysuria, hematuria, rash, new joint aches, sick contacts, and recent travel. Patient is 69yM with PMH ppm, anaphylaxis/angioedema, jared, DM, htn presenting with 1-2 hours of left tongue, left lip, left neck swelling s/p eating a chocolate bar today, No recent med changes, No new detergents/soaps/meds/creams/foods/etc. Had similar episode in the past, after changing BP med, but then switched back to . His old med (ARB/chlorthalidone) and is still on this, didn't follow up with allergist or get epipen. No family history of angioedema. Does not have trouble breathing or difficulty swallowing.    ROS: Denies fever, palpitations, chills, recent sickness, HA, vision changes, cough, SOB, chest pain, abdominal pain, n/v/d/c, dysuria, hematuria, rash, new joint aches, sick contacts, and recent travel.    20:21 Muna att: 69M p/w tongue swelling x 1-2 hours. One to two hour prior patient first noted left sided tongue, lip and neck swelling. Reports voice changes and lip swelling. Denies dyspnea, throat tightness, cough, or wheeze. Off arb.

## 2019-06-22 NOTE — ED CDU PROVIDER INITIAL DAY NOTE - OBJECTIVE STATEMENT
ED Provider Note: Authored by Resident Dr. Young, Patient is 69yM with PMH ppm, anaphylaxis/angioedema, jared, DM, htn presenting with 1-2 hours of left tongue, left lip, left neck swelling s/p eating a chocolate bar today, No recent med changes, No new detergents/soaps/meds/creams/foods/etc. Had similar episode in the past, after changing BP med, but then switched back to . His old med (ARB/chlorthalidone) and is still on this, didn't follow up with allergist or get epipen. No family history of angioedema. Does not have trouble breathing or difficulty swallowing.  ROS: Denies fever, palpitations, chills, recent sickness, HA, vision changes, cough, SOB, chest pain, abdominal pain, n/v/d/c, dysuria, hematuria, rash, new joint aches, sick contacts, and recent travel. 20:21 Toro att: 69M p/w tongue swelling x 1-2 hours. One to two hour prior patient first noted left sided tongue, lip and neck swelling. Reports voice changes and lip swelling. Denies dyspnea, throat tightness, cough, or wheeze. Off arb.  CDU Initial Day Note: IRENE Shaffer, Agree w/ above, pt received subq epi @ 21:43, states he is feeling much better after epi, talking in full sentences, pt still w/ lingual edema; however, no dyspnea, angioedema, sob, cough, drooling, stridor, wheezing.

## 2019-06-22 NOTE — ED ADULT NURSE NOTE - OBJECTIVE STATEMENT
pt received in trA alert and oriented x 3. pt c/o tongue swelling and states he ate a chocolate bar an hour ago. pt also states similar symptoms happened in the past without known cause. tongue swelling noted and garbled speech. no drooling noted. pt denies SOB, chest pain, rash, itching, fevers, chills, n/v. airway is patent, respirations even and unlabored, lung sounds clear bilaterally. pulses present x 4 extremities. pacemaker noted to left chest wall. paced rhythm on monitor.

## 2019-06-22 NOTE — ED PROVIDER NOTE - ATTENDING CONTRIBUTION TO CARE
Dr. Toro: I have personally seen and examined this patient at the bedside. I have fully participated in the care of this patient. I have reviewed all pertinent clinical information, including history, physical exam, plan and the Resident's note and agree except as noted. HPI above as by me. PE above as by me. Angioedema, low threshold for epi, ent to scope PLAN cdu

## 2019-06-22 NOTE — ED PROVIDER NOTE - PROGRESS NOTE DETAILS
Reassessed patient, continues to have no breathing issues and no sensation of throat closing, now however R side of tongue is swelling so will administer IM epi -SM Patient's sensation of tongue swelling is improved and his voice is clinically improved, no tachycardia after epi, will send to CDU for overnight monitoring -SM

## 2019-06-22 NOTE — ED ADULT NURSE REASSESSMENT NOTE - NS ED NURSE REASSESS COMMENT FT1
Pt reports tongue swelling remains, speech is mildly muffled, resps unlabored, o2sat maintained.  Pt advised to remain on CM and pulse ox, awaiting CDU Eval following addl meds.  VSS, medicated as ordered.  Rpt handoff to primary RN Yoanna.

## 2019-06-22 NOTE — ED ADULT TRIAGE NOTE - CHIEF COMPLAINT QUOTE
Patient brought to ER from home by EMS for left side tongue swelling about an hour ago. It is affecting his speech and he has hx of anaphylaxis of some unknown.

## 2019-06-23 VITALS
SYSTOLIC BLOOD PRESSURE: 144 MMHG | HEART RATE: 74 BPM | DIASTOLIC BLOOD PRESSURE: 66 MMHG | TEMPERATURE: 98 F | RESPIRATION RATE: 16 BRPM | OXYGEN SATURATION: 100 %

## 2019-06-23 PROCEDURE — 99217: CPT | Mod: GC

## 2019-06-23 RX ORDER — AMLODIPINE BESYLATE 2.5 MG/1
10 TABLET ORAL DAILY
Refills: 0 | Status: DISCONTINUED | OUTPATIENT
Start: 2019-06-22 | End: 2019-06-23

## 2019-06-23 RX ORDER — FAMOTIDINE 10 MG/ML
1 INJECTION INTRAVENOUS
Qty: 10 | Refills: 0
Start: 2019-06-23 | End: 2019-06-27

## 2019-06-23 RX ORDER — FAMOTIDINE 10 MG/ML
20 INJECTION INTRAVENOUS ONCE
Refills: 0 | Status: COMPLETED | OUTPATIENT
Start: 2019-06-23 | End: 2019-06-23

## 2019-06-23 RX ORDER — AMLODIPINE BESYLATE 2.5 MG/1
10 TABLET ORAL ONCE
Refills: 0 | Status: COMPLETED | OUTPATIENT
Start: 2019-06-23 | End: 2019-06-23

## 2019-06-23 RX ORDER — EPINEPHRINE 0.3 MG/.3ML
0.3 INJECTION INTRAMUSCULAR; SUBCUTANEOUS
Qty: 1 | Refills: 0
Start: 2019-06-23

## 2019-06-23 RX ADMIN — AMLODIPINE BESYLATE 10 MILLIGRAM(S): 2.5 TABLET ORAL at 07:09

## 2019-06-23 RX ADMIN — FAMOTIDINE 20 MILLIGRAM(S): 10 INJECTION INTRAVENOUS at 08:49

## 2019-06-23 RX ADMIN — Medication 40 MILLIGRAM(S): at 08:49

## 2019-06-23 NOTE — ED CDU PROVIDER DISPOSITION NOTE - ATTENDING CONTRIBUTION TO CARE
Dr Bloch, ATTENDING MD-  I performed a face to face bedside interview with patient regarding history of present illness, review of symptoms and past medical history. I completed an independent physical exam.  I have discussed patient's plan of care with PA.   Documentation as above in the note.  Patient feeling well stable airway, HEENT nml lungs clear abd soft nontender. Okay for discharge

## 2019-06-23 NOTE — ED CDU PROVIDER SUBSEQUENT DAY NOTE - PROGRESS NOTE DETAILS
Patient resting comfortable in NAD, VSS, pt w/ minimal lingual edema presently, will continue to obs and document any acute interval changes. Patient resting comfortably, received at sign out from IRENE Shaffer. Pt sx improved. Discussed with ENT and state no further intervention from them. Pt maintaining airway and secretions, tolerating PO. Pt stable for dc home with steroids, pepcid and epi-pen. Pt advised to dc his arb and f/u with PCP and allergist. Pt understood and agreed with plan. All question and concerns addressed. Strict return instructions given. No active angioedema- airway patent- okay for discharge

## 2019-06-23 NOTE — ED CDU PROVIDER DISPOSITION NOTE - PLAN OF CARE
Patient advised to follow up with PRIMARY CARE DOCTOR IN 1-2 DAYS AND ALLERGIST WITHIN WEEK  and told to return to the emergency department immediately for any new or concerning symptoms such as worsening swelling, chest pain, shortness of breath, tightness OR ANY OTHER COMPLAINTS. Patient agrees with plan.    -Take prednisone daily   -Take pepcid twice a day   -Use epi-pen as needed for severe reaction see sheet attached   -STOP TAKING LOSARTAN BLOOD PRESSURE MEDICATION  -STAY AWAY FROM CHOCOLATE UNTIL FOLLOW UP WITH ALLERGIST   -Monitor your blood sugar at home  -Stay well hydrated   RETURN IF ANY NEW OR WORSENING SYMPTOMS

## 2019-06-23 NOTE — ED CDU PROVIDER DISPOSITION NOTE - CLINICAL COURSE
Patient is a 69yM with PMH ppm, anaphylaxis/angioedema, jared, DM, htn who presented to ED c/o left tongue, left lip, left neck swelling s/p eating a chocolate bar. Pt seen and worked up in ED for angioedema/allergic reaction given epi, solumedrol, pepcid and benadryl. Pt seen by ENT scope WNL no edema. Pt transferred to CDU for; IV steroids, pepcid, benadryl prn, vitals q4, airway monitoring and frequent re-evals. While in CDU no acute events, patient stable, Vitals stable, sx improved. Pt maintaining secretions, tolerating PO, discussed with ENT this am, no ent re-eval or intervention at this time. Pt stable for dc home and outpatient f/u with PCP in 1-2 days and allergist within week. Pt aware to stay away from chocolate and stop losartan until f/u with specialist. Pt given rx for steroid and pepcid and epi- pen. Aware to monitor glucose. All questions and concerns addressed. Strict return instructions given.

## 2019-06-23 NOTE — ED CDU PROVIDER DISPOSITION NOTE - NSFOLLOWUPCLINICS_GEN_ALL_ED_FT
[facility]  Allergy  [address] [place] [address]  Phone: [phone]  Fax:   Follow Up Time: 1-3 Days

## 2019-06-23 NOTE — ED CDU PROVIDER SUBSEQUENT DAY NOTE - MEDICAL DECISION MAKING DETAILS
69yM with PMH ppm, anaphylaxis/angioedema, jared, DM, htn presenting with 1-2 hours of left tongue, left lip, left neck swelling s/p eating a chocolate bar today. Pt seen in ED given Epi, steroid, pepcid, benadryl, seen by ENT.  Transferred to CDU for continued moniotoring of allergic rxn, steroids, pepcid, benadryl prn, vitals q4 and frquent re-eval.

## 2019-06-23 NOTE — ED CDU PROVIDER DISPOSITION NOTE - CARE PROVIDER_API CALL
Clinton Oliveros)  Internal Medicine; Pulmonary Disease  [address], [address]  [place] [address]  Phone: [phone]  Fax: [phone]  Follow Up Time: 1-3 Days

## 2019-06-23 NOTE — ED CDU PROVIDER SUBSEQUENT DAY NOTE - ATTENDING CONTRIBUTION TO CARE
Dr Bloch, ATTENDING MD-  I performed a face to face bedside interview with patient regarding history of present illness, review of symptoms and past medical history. I completed an independent physical exam.  I have discussed patient's plan of care with PA.   Documentation as above in the note.  Patient examined , well appearing NAD heent airway patent, tongue with no swelling, uvula nml, no lip swelling, voice nml, lungs clear, abd soft nontender, slight flushed facies, but no rashes, Heart sounds nml nonfocal neuro exam

## 2019-06-23 NOTE — ED CDU PROVIDER SUBSEQUENT DAY NOTE - PHYSICAL EXAMINATION
Vital signs reviewed.   CONSTITUTIONAL: Well-appearing; well-nourished; in no apparent distress. Non-toxic appearing.   HEAD: Normocephalic, atraumatic.  EYES: PERRL, EOM intact, conjunctiva and sclera WNL.  ENT: normal nose; no rhinorrhea; normal pharynx with no tonsillar hypertrophy, no erythema, no exudate, no lymphadenopathy. Airway patent, no tongue elevation, tongue swelling improved, uvula midline. Pt speaking in full sentences. No drooling, Pt maintaining secretions.   NECK: Trachea midline   CARD: Normal S1, S2  RESP: NAD  EXT/MS: moves all extremities;  SKIN: Normal for age and race  NEURO: Awake, alert, oriented x 3, no gross deficits  PSYCH: Normal mood; appropriate affect.

## 2019-06-23 NOTE — ED CDU PROVIDER SUBSEQUENT DAY NOTE - HISTORY
As per initial CDU note: Patient is "69yM with PMH ppm, anaphylaxis/angioedema, jared, DM, htn presenting with 1-2 hours of left tongue, left lip, left neck swelling s/p eating a chocolate bar today". Pt also noted to be on ARB- Losartan. Pt seen and worked up in ED; ENT scoped patient no edema noted, only tongue swelling. Pt transferred to CDU for; Steroids, pepcid, airway/sx monitoring, vitals q4 and frequent re-evals. This am, patient feeling much better, states tongue swelling improved, tolerating PO, denies drooling, change in voice, chest pain. sob, tightness or any other complaints.

## 2019-06-25 ENCOUNTER — APPOINTMENT (OUTPATIENT)
Dept: PEDIATRIC ALLERGY IMMUNOLOGY | Facility: CLINIC | Age: 69
End: 2019-06-25
Payer: MEDICARE

## 2019-06-25 VITALS
BODY MASS INDEX: 40.56 KG/M2 | DIASTOLIC BLOOD PRESSURE: 63 MMHG | HEART RATE: 60 BPM | SYSTOLIC BLOOD PRESSURE: 134 MMHG | WEIGHT: 290.79 LBS

## 2019-06-25 PROCEDURE — 99204 OFFICE O/P NEW MOD 45 MIN: CPT | Mod: 25

## 2019-06-25 PROCEDURE — 95004 PERQ TESTS W/ALRGNC XTRCS: CPT

## 2019-06-25 RX ORDER — NEBIVOLOL HYDROCHLORIDE 5 MG/1
5 TABLET ORAL
Refills: 0 | Status: DISCONTINUED | COMMUNITY
Start: 2019-04-16 | End: 2019-06-25

## 2019-06-25 RX ORDER — HYDROCHLOROTHIAZIDE 25 MG/1
25 TABLET ORAL
Refills: 0 | Status: ACTIVE | COMMUNITY

## 2019-06-25 RX ORDER — EPINEPHRINE 0.3 MG/.3ML
0.3 INJECTION INTRAMUSCULAR
Refills: 0 | Status: ACTIVE | COMMUNITY

## 2019-06-25 RX ORDER — SODIUM CHLORIDE/ALOE VERA
SPRAY, NON-AEROSOL (ML) NASAL
Qty: 1 | Refills: 0 | Status: ACTIVE | COMMUNITY
Start: 2019-06-25 | End: 1900-01-01

## 2019-06-25 NOTE — CONSULT LETTER
[Dear  ___] : Dear  [unfilled], [Consult Letter:] : I had the pleasure of evaluating your patient, [unfilled]. [Please see my note below.] : Please see my note below. [Consult Closing:] : Thank you very much for allowing me to participate in the care of this patient.  If you have any questions, please do not hesitate to contact me. [Sincerely,] : Sincerely, [DrDenise  ___] : Dr. NG [DrDenise ___] : Dr. NG [FreeTextEntry3] : Faith Rodney MD FAACIARAI, MICHELE\par Adult and Pediatric Allergy, Asthma and Clinical Immunology\par  of Medicine and Pediatrics at\par   [facility]\par Section Head, Adult Allergy and Immunology\par   [facility]\par   Division of Allergy, Asthma and Immunology\par   [address] [place] [address]\par   [place], [place] [address]\par   Phone [phone]  Fax [phone]\par \par \par

## 2019-06-25 NOTE — HISTORY OF PRESENT ILLNESS
[de-identified] : MYRIAM FISHER is a 69 year  old male with diabetes, hyperlipidemia, hypertension, RADHA on CPAP, depression,  presents for allergy evaluation. He had 2 episodes of lip swelling over the last year: None of these episodes were associated with food. There were no associated shortness of breath, urticaria or other allergic symptoms. \par \par 6/22/19- swollen tongue, he took Diphenhydramine/Benadryl that may have helped. In ER he received epinephrine that helped instantly. He was observed overnight. \par He was treated with prednisone, pepcid,  Epinephrine Autoinjector. Patient states the .epinephrine helped quickly. \par \par About a year ago- woke up at 4 am with swollen tongue and couldn't breath. Doctors told him it was a BP medicine and changed i. Patient does not remember all his medication changes and records are not available in EMR.\par \par Janumet.(Sitagliptin Phosphate/Metformin Hydrochloride)  - has been taking since 7/2018.\par Prior to that he was on KOMBIGLYZE ( Metformin+ Saxagliptin) that he has not had a problem with. \par He was on Losartan that he was told to stop after his recent episode 3 days ago. \par He currently is taking amlodipine and HCTZ. \par \par Patient also complains of dry nasal secretions, postnasal drip and snoring in the setting of RADHA/ on CPAP.

## 2019-06-25 NOTE — PHYSICAL EXAM
[Alert] : alert [Normal Voice/Communication] : normal voice communication [No Acute Distress] : no acute distress [No Discharge] : no discharge [No Photophobia] : no photophobia [Sclera Not Icteric] : sclera not icteric [Normal TMs] : both tympanic membranes were normal [Normal Outer Ear/Nose] : the ears and nose were normal in appearance [Normal Lips/Tongue] : the lips and tongue were normal [No Thrush] : no thrush [No Oral Lesions or Ulcers] : no oral lesions or ulcers [Supple] : the neck was supple [Normal Rate and Effort] : normal respiratory rhythm and effort [No Crackles] : no crackles [Bilateral Audible Breath Sounds] : bilateral audible breath sounds [Normal Rate] : heart rate was normal  [Regular Rhythm] : with a regular rhythm [Normal S1, S2] : normal S1 and S2 [No murmur] : no murmur [Not Tender] : non-tender [Soft] : abdomen soft [Normal Cervical Lymph Nodes] : cervical [No Rash] : no rash [No Skin Lesions] : no skin lesions [No Joint Swelling or Erythema] : no joint swelling or erythema [No clubbing] : no clubbing [No Edema] : no edema [Normal Affect] : affect was normal [Normal Mood] : mood was normal [No Cyanosis] : no cyanosis [Alert, Awake, Oriented as Age-Appropriate] : alert, awake, oriented as age appropriate [Conjunctival Erythema] : no conjunctival erythema [Boggy Nasal Turbinates] : no boggy and/or pale nasal turbinates [Pharyngeal erythema] : no pharyngeal erythema [Exudate] : no exudate [Clear Rhinorrhea] : no clear rhinorrhea was seen [Wheezing] : no wheezing was heard [de-identified] : BMI-40.5 [Eczematous Patches] : no eczematous patches [de-identified] : b/l dry nasal secretions [de-identified] : obese

## 2019-06-25 NOTE — IMPRESSION
[Allergy Testing Dust Mite] : dust mites [Allergy Testing Dog] : dog [Allergy Testing Mixed Feathers] : feathers [Allergy Testing Cockroach] : cockroach [Allergy Testing Cat] : cat [] : molds [Allergy Testing Weeds] : weeds [Allergy Testing Trees] : trees [Allergy Testing Grasses] : grasses

## 2019-06-25 NOTE — REVIEW OF SYSTEMS
[Post Nasal Drip] : post nasal drip [Snoring] : snoring [Swelling] : swelling [Nl] : Gastrointestinal [Eye Discharge] : no eye discharge [Fever] : no fever [SOB at Rest] : no shortness of breath at rest [Eye Redness] : no redness [Eye Itching] : no itchy eyes [Cough] : no cough [Wheezing] : no wheezing [Limping] : no limping [Headache] : no headache [Urticaria] : no urticaria [Atopic Dermatitis] : no atopic dermatitis [FreeTextEntry4] : dry nasal d/c [Easy Bruising] : no tendency for easy bruising [FreeTextEntry5] : HTN, pacemaker

## 2019-06-28 ENCOUNTER — TRANSCRIPTION ENCOUNTER (OUTPATIENT)
Age: 69
End: 2019-06-28

## 2019-06-28 LAB
C1Q SERPL-MCNC: 26 MG/DL
C4 SERPL-MCNC: 48 MG/DL

## 2019-07-15 ENCOUNTER — APPOINTMENT (OUTPATIENT)
Dept: PULMONOLOGY | Facility: CLINIC | Age: 69
End: 2019-07-15
Payer: MEDICARE

## 2019-07-15 VITALS
BODY MASS INDEX: 40.6 KG/M2 | HEART RATE: 71 BPM | HEIGHT: 71 IN | OXYGEN SATURATION: 97 % | TEMPERATURE: 97.5 F | DIASTOLIC BLOOD PRESSURE: 65 MMHG | WEIGHT: 290 LBS | RESPIRATION RATE: 15 BRPM | SYSTOLIC BLOOD PRESSURE: 134 MMHG

## 2019-07-15 PROCEDURE — 99213 OFFICE O/P EST LOW 20 MIN: CPT

## 2019-07-17 NOTE — PHYSICAL EXAM
[General Appearance - Well Developed] : well developed [General Appearance - Well Nourished] : well nourished [Normal Conjunctiva] : the conjunctiva exhibited no abnormalities [Normal Oropharynx] : normal oropharynx [Jugular Venous Distention Increased] : there was no jugular-venous distention [Heart Sounds] : normal S1 and S2 [Murmurs] : no murmurs present [Auscultation Breath Sounds / Voice Sounds] : lungs were clear to auscultation bilaterally [Lungs Percussion] : the lungs were normal to percussion [Abdomen Soft] : soft [Abdomen Tenderness] : non-tender [Abdomen Mass (___ Cm)] : no abdominal mass palpated [Nail Clubbing] : no clubbing of the fingernails [Cyanosis, Localized] : no localized cyanosis [Petechial Hemorrhages (___cm)] : no petechial hemorrhages [] : no ischemic changes [FreeTextEntry2] : Pulses 1-2 plus

## 2019-07-17 NOTE — PROCEDURE
[FreeTextEntry1] : cpap data downloaded and discussed with patient\par \par discussed HHS results very severe RADHA

## 2019-07-17 NOTE — HISTORY OF PRESENT ILLNESS
[FreeTextEntry1] : Had recent 2 night sleep study and here for results. was able to get data form his Dme on his resmed machine with nasal pillows\par \par \par losing weight saw endo recently

## 2019-07-17 NOTE — ASSESSMENT
[FreeTextEntry1] : will  get a new Resmed auto cpap 8--20 with a Resmed air fit 10 large nasal mask\par \par r/t 3 months for compliance

## 2019-07-23 ENCOUNTER — APPOINTMENT (OUTPATIENT)
Dept: PEDIATRIC ALLERGY IMMUNOLOGY | Facility: CLINIC | Age: 69
End: 2019-07-23
Payer: MEDICARE

## 2019-07-23 VITALS
SYSTOLIC BLOOD PRESSURE: 154 MMHG | DIASTOLIC BLOOD PRESSURE: 74 MMHG | HEIGHT: 70.98 IN | BODY MASS INDEX: 40.74 KG/M2 | OXYGEN SATURATION: 95 % | WEIGHT: 290.99 LBS

## 2019-07-23 DIAGNOSIS — T50.905D ADVERSE EFFECT OF UNSPECIFIED DRUGS, MEDICAMENTS AND BIOLOGICAL SUBSTANCES, SUBSEQUENT ENCOUNTER: ICD-10-CM

## 2019-07-23 DIAGNOSIS — T50.905A ADVERSE EFFECT OF UNSPECIFIED DRUGS, MEDICAMENTS AND BIOLOGICAL SUBSTANCES, INITIAL ENCOUNTER: ICD-10-CM

## 2019-07-23 DIAGNOSIS — T78.3XXD ANGIONEUROTIC EDEMA, SUBSEQUENT ENCOUNTER: ICD-10-CM

## 2019-07-23 DIAGNOSIS — J30.89 OTHER ALLERGIC RHINITIS: ICD-10-CM

## 2019-07-23 DIAGNOSIS — T78.3XXA ANGIONEUROTIC EDEMA, INITIAL ENCOUNTER: ICD-10-CM

## 2019-07-23 PROCEDURE — 99214 OFFICE O/P EST MOD 30 MIN: CPT

## 2019-07-23 RX ORDER — SITAGLIPTIN AND METFORMIN HYDROCHLORIDE 50; 1000 MG/1; MG/1
50-1000 TABLET, FILM COATED ORAL
Refills: 0 | Status: COMPLETED | COMMUNITY
Start: 2018-10-12 | End: 2019-07-01

## 2019-07-23 RX ORDER — METFORMIN ER 500 MG 500 MG/1
500 TABLET ORAL
Refills: 0 | Status: ACTIVE | COMMUNITY

## 2019-07-23 NOTE — CONSULT LETTER
[Dear  ___] : Dear  [unfilled], [Consult Letter:] : I had the pleasure of evaluating your patient, [unfilled]. [Please see my note below.] : Please see my note below. [Consult Closing:] : Thank you very much for allowing me to participate in the care of this patient.  If you have any questions, please do not hesitate to contact me. [Sincerely,] : Sincerely, [DrDenise  ___] : Dr. NG [FreeTextEntry3] : Faith Rodney MD FAACIARAI, MICHELE\par Adult and Pediatric Allergy, Asthma and Clinical Immunology\par  of Medicine and Pediatrics at\par   [facility]\par Section Head, Adult Allergy and Immunology\par   [facility]\par   Division of Allergy, Asthma and Immunology\par   [address] [place] [address]\par   [place], [place] [address]\par   Phone [phone]  Fax [phone]\par \par \par  [DrDenise ___] : Dr. NG

## 2019-07-23 NOTE — PHYSICAL EXAM
[Alert] : alert [No Acute Distress] : no acute distress [Normal Voice/Communication] : normal voice communication [No Discharge] : no discharge [No Photophobia] : no photophobia [Sclera Not Icteric] : sclera not icteric [Conjunctival Erythema] : no conjunctival erythema [Normal Outer Ear/Nose] : the ears and nose were normal in appearance [Normal Lips/Tongue] : the lips and tongue were normal [No Thrush] : no thrush [No Oral Lesions or Ulcers] : no oral lesions or ulcers [Boggy Nasal Turbinates] : no boggy and/or pale nasal turbinates [Pharyngeal erythema] : no pharyngeal erythema [Exudate] : no exudate [Supple] : the neck was supple [Normal Rate and Effort] : normal respiratory rhythm and effort [No Crackles] : no crackles [Bilateral Audible Breath Sounds] : bilateral audible breath sounds [Wheezing] : no wheezing was heard [Normal Rate] : heart rate was normal  [Regular Rhythm] : with a regular rhythm [Soft] : abdomen soft [Not Tender] : non-tender [Normal Cervical Lymph Nodes] : cervical [No Rash] : no rash [No Skin Lesions] : no skin lesions [Eczematous Patches] : no eczematous patches [No clubbing] : no clubbing [No Cyanosis] : no cyanosis [Normal Mood] : mood was normal [Normal Affect] : affect was normal [Alert, Awake, Oriented as Age-Appropriate] : alert, awake, oriented as age appropriate [de-identified] : BMI-40.6 [de-identified] : obese

## 2019-07-23 NOTE — HISTORY OF PRESENT ILLNESS
[de-identified] : MYRIAM FISHER is a 69 year  old male with diabetes, hyperlipidemia, hypertension, RADHA on CPAP, depression, returns for a follow up of recurrent angioedema:\par - Episodes of angioedema on Losartran and Janumet.(Sitagliptin Phosphate/Metformin Hydrochloride)\par - He stopped Losartran 6/2019 and Janumet.(Sitagliptin Phosphate/Metformin Hydrochloride) 7/1/19 and had no episodes of angioedema.\par - He was switched to metformin.\par \par He denies any nasal symptoms except postnasal drip.\par \par \par HISTORY:\par He had 2 episodes of lip swelling over the last year: None of these episodes were associated with food. There were no associated shortness of breath, urticaria or other allergic symptoms. \par \par 6/22/19- swollen tongue, he took Diphenhydramine/Benadryl that may have helped. In ER he received epinephrine that helped instantly. He was observed overnight. \par He was treated with prednisone, pepcid,  Epinephrine Autoinjector. Patient states the .epinephrine helped quickly. \par \par About a year ago- woke up at 4 am with swollen tongue and couldn't breath. Doctors told him it was a BP medicine and changed i. Patient does not remember all his medication changes and records are not available in EMR.\par \par Janumet.(Sitagliptin Phosphate/Metformin Hydrochloride)  - has been taking since 7/2018.\par Prior to that he was on KOMBIGLYZE ( Metformin+ Saxagliptin) that he has not had a problem with. \par He was on Losartan that he was told to stop after his recent episode 3 days ago. \par He currently is taking amlodipine and HCTZ. \par \par Patient also complains of dry nasal secretions, postnasal drip and snoring in the setting of RADHA/ on CPAP.

## 2019-07-23 NOTE — REVIEW OF SYSTEMS
[Fever] : no fever [Snoring] : snoring [Post Nasal Drip] : post nasal drip [Difficulty Breathing] : no dyspnea [SOB at Rest] : no shortness of breath at rest [Cough] : no cough [Headache] : no headache [Wheezing] : no wheezing [Urticaria] : no urticaria [Atopic Dermatitis] : no atopic dermatitis [Swelling] : swelling [Nl] : no history of recurrent infections of the sinuses,ear, throat, lungs (pneumonia/ bronchitis) or skin [FreeTextEntry5] : HTN, pacemaker [FreeTextEntry4] : dry nasal d/c

## 2019-07-25 ENCOUNTER — TRANSCRIPTION ENCOUNTER (OUTPATIENT)
Age: 69
End: 2019-07-25

## 2019-08-28 ENCOUNTER — MEDICATION RENEWAL (OUTPATIENT)
Age: 69
End: 2019-08-28

## 2019-09-11 ENCOUNTER — RX RENEWAL (OUTPATIENT)
Age: 69
End: 2019-09-11

## 2019-11-08 ENCOUNTER — APPOINTMENT (OUTPATIENT)
Dept: PULMONOLOGY | Facility: CLINIC | Age: 69
End: 2019-11-08
Payer: MEDICARE

## 2019-11-08 VITALS
WEIGHT: 288 LBS | BODY MASS INDEX: 40.32 KG/M2 | DIASTOLIC BLOOD PRESSURE: 73 MMHG | OXYGEN SATURATION: 95 % | HEIGHT: 70.98 IN | TEMPERATURE: 98.6 F | HEART RATE: 70 BPM | SYSTOLIC BLOOD PRESSURE: 158 MMHG

## 2019-11-08 VITALS — SYSTOLIC BLOOD PRESSURE: 138 MMHG | DIASTOLIC BLOOD PRESSURE: 78 MMHG

## 2019-11-08 PROCEDURE — 99213 OFFICE O/P EST LOW 20 MIN: CPT | Mod: 25

## 2019-11-08 PROCEDURE — 90662 IIV NO PRSV INCREASED AG IM: CPT

## 2019-11-08 PROCEDURE — G0008: CPT

## 2019-11-08 RX ORDER — AMLODIPINE BESYLATE 10 MG/1
10 TABLET ORAL DAILY
Qty: 90 | Refills: 3 | Status: ACTIVE | COMMUNITY
Start: 2019-11-08 | End: 1900-01-01

## 2019-11-08 NOTE — ASSESSMENT
[FreeTextEntry1] : Patient compliant to CPAP therapy and having positive clinical response to treatment. Continue present settings.\par renewed mediations reviewed\par renewed meds/reviewed\par \par \par r/t Pe 3 months

## 2019-11-08 NOTE — DISCUSSION/SUMMARY
[FreeTextEntry1] : stable Htn\par renewed xanax\par cpap data downloaded and discussed with patient got from aprea\par

## 2019-11-08 NOTE — HISTORY OF PRESENT ILLNESS
[FreeTextEntry1] : Has new res med machine with nasal pillows , likes it  using nightly\par \par seeing endo next week and cardiologist

## 2020-01-13 ENCOUNTER — APPOINTMENT (OUTPATIENT)
Dept: PULMONOLOGY | Facility: CLINIC | Age: 70
End: 2020-01-13
Payer: MEDICARE

## 2020-01-13 PROCEDURE — 94660 CPAP INITIATION&MGMT: CPT

## 2020-01-13 PROCEDURE — ZZZZZ: CPT

## 2020-01-19 NOTE — PROCEDURE
[FreeTextEntry1] : pt was seen at doctors office to assist with his mask issues. pt is using a nasal pillow mask and tried all three sizes however he moves a lot at night and is unable to keep the nasal pillow secure in his nostrils. i suggested pt tries a nasal mask size large with a full support. pt agreed with the decision to change to a nasal mask with full support and understood how the nasal mask will work. I task popeye to rx his [employer] to get him a new mask asap.

## 2020-02-12 ENCOUNTER — APPOINTMENT (OUTPATIENT)
Dept: PULMONOLOGY | Facility: CLINIC | Age: 70
End: 2020-02-12

## 2020-04-29 ENCOUNTER — APPOINTMENT (OUTPATIENT)
Dept: PULMONOLOGY | Facility: CLINIC | Age: 70
End: 2020-04-29
Payer: MEDICARE

## 2020-04-29 PROCEDURE — 99213 OFFICE O/P EST LOW 20 MIN: CPT | Mod: 95

## 2020-04-29 RX ORDER — AMLODIPINE BESYLATE 5 MG/1
TABLET ORAL
Refills: 0 | Status: DISCONTINUED | COMMUNITY
End: 2020-04-29

## 2020-04-29 RX ORDER — ICOSAPENT ETHYL 1000 MG/1
1 CAPSULE ORAL
Qty: 360 | Refills: 0 | Status: DISCONTINUED | COMMUNITY
Start: 2020-02-17

## 2020-05-01 NOTE — HISTORY OF PRESENT ILLNESS
[Home] : at home, [unfilled] , at the time of the visit. [Medical Office: ([facility])___] : at the medical office located in  [TextBox_4] : This visit was provided via telehealth using real-time 2-way audio visual technology. The patient, MYRIAM FISHER , was located at home, [address]\[place] [address]  at the time of the visit.\par The provider, Clinton Oliveros, was located at office [address] [place], [place] at the time of the visit. \par \par  The patient, Mr. MYRIAM FISHER  and Physician Clinton Adams participated in the telehealth encounter.\par \par Verbal consent obtained by  from patient\par \par \par Doing well\par Needs renewal on anxiety meds\par \par Lost weight\par Not checking BS at home. \par Started Ozempic via endo.\par \par Using CPAP.\par Will obtain data.\par \par Meds reviewed.

## 2020-05-01 NOTE — ASSESSMENT
[FreeTextEntry1] : Medications reviewed and renewed.\par  F/U post pandemic\par \par Duration discussion and decision making 15 minutes.\par

## 2020-07-07 ENCOUNTER — RX RENEWAL (OUTPATIENT)
Age: 70
End: 2020-07-07

## 2020-09-14 ENCOUNTER — RX RENEWAL (OUTPATIENT)
Age: 70
End: 2020-09-14

## 2020-09-14 RX ORDER — ALPRAZOLAM 1 MG/1
1 TABLET ORAL
Qty: 30 | Refills: 0 | Status: ACTIVE | COMMUNITY
Start: 2019-01-22 | End: 1900-01-01

## 2020-09-25 ENCOUNTER — APPOINTMENT (OUTPATIENT)
Dept: PULMONOLOGY | Facility: CLINIC | Age: 70
End: 2020-09-25
Payer: MEDICARE

## 2020-09-25 VITALS
TEMPERATURE: 97.6 F | OXYGEN SATURATION: 98 % | HEART RATE: 64 BPM | SYSTOLIC BLOOD PRESSURE: 151 MMHG | DIASTOLIC BLOOD PRESSURE: 70 MMHG

## 2020-09-25 PROCEDURE — 99213 OFFICE O/P EST LOW 20 MIN: CPT | Mod: 25

## 2020-09-25 PROCEDURE — 90662 IIV NO PRSV INCREASED AG IM: CPT

## 2020-09-25 PROCEDURE — G0008: CPT

## 2020-09-30 NOTE — HISTORY OF PRESENT ILLNESS
[>= 30 pack years] : >= 30 pack years [TextBox_4] : \par \par Doing well\par Needs renewal on anxiety Meds\par \par Lost weight\par now saw endo and added Actos pending labs\par Started Ozempic via endo.\par \par Using CPAP.\par Will obtain data.\par \par Meds reviewed. [TextBox_11] : 1 [TextBox_13] : 30 [YearQuit] : 2000

## 2020-09-30 NOTE — REASON FOR VISIT
[Follow-Up] : a follow-up visit [Sleep Apnea] : sleep apnea [TextBox_44] : dm needs renewal of xanax

## 2021-01-01 ENCOUNTER — APPOINTMENT (OUTPATIENT)
Dept: GASTROENTEROLOGY | Facility: CLINIC | Age: 71
End: 2021-01-01
Payer: MEDICARE

## 2021-01-01 ENCOUNTER — APPOINTMENT (OUTPATIENT)
Dept: PULMONOLOGY | Facility: CLINIC | Age: 71
End: 2021-01-01
Payer: MEDICARE

## 2021-01-01 ENCOUNTER — MED ADMIN CHARGE (OUTPATIENT)
Age: 71
End: 2021-01-01

## 2021-01-01 ENCOUNTER — NON-APPOINTMENT (OUTPATIENT)
Age: 71
End: 2021-01-01

## 2021-01-01 VITALS
DIASTOLIC BLOOD PRESSURE: 73 MMHG | HEART RATE: 84 BPM | TEMPERATURE: 97.5 F | OXYGEN SATURATION: 95 % | SYSTOLIC BLOOD PRESSURE: 158 MMHG

## 2021-01-01 VITALS — SYSTOLIC BLOOD PRESSURE: 150 MMHG | DIASTOLIC BLOOD PRESSURE: 80 MMHG

## 2021-01-01 VITALS
OXYGEN SATURATION: 97 % | SYSTOLIC BLOOD PRESSURE: 157 MMHG | TEMPERATURE: 98.2 F | DIASTOLIC BLOOD PRESSURE: 78 MMHG | HEIGHT: 70 IN | WEIGHT: 185 LBS | HEART RATE: 64 BPM | RESPIRATION RATE: 16 BRPM | BODY MASS INDEX: 26.48 KG/M2

## 2021-01-01 DIAGNOSIS — Z23 ENCOUNTER FOR IMMUNIZATION: ICD-10-CM

## 2021-01-01 DIAGNOSIS — K59.00 CONSTIPATION, UNSPECIFIED: ICD-10-CM

## 2021-01-01 PROCEDURE — 90686 IIV4 VACC NO PRSV 0.5 ML IM: CPT

## 2021-01-01 PROCEDURE — 99213 OFFICE O/P EST LOW 20 MIN: CPT | Mod: 25

## 2021-01-01 PROCEDURE — 90662 IIV NO PRSV INCREASED AG IM: CPT

## 2021-01-01 PROCEDURE — G0008: CPT

## 2021-01-01 PROCEDURE — 99213 OFFICE O/P EST LOW 20 MIN: CPT

## 2021-01-01 RX ORDER — SEMAGLUTIDE 1.34 MG/ML
2 INJECTION, SOLUTION SUBCUTANEOUS
Qty: 4 | Refills: 0 | Status: DISCONTINUED | COMMUNITY
Start: 2020-03-06 | End: 2021-01-01

## 2021-01-01 RX ORDER — OMEPRAZOLE 20 MG/1
20 CAPSULE, DELAYED RELEASE ORAL DAILY
Qty: 90 | Refills: 3 | Status: ACTIVE | COMMUNITY
Start: 2021-07-14 | End: 1900-01-01

## 2021-01-01 RX ORDER — SEMAGLUTIDE 1.34 MG/ML
4 INJECTION, SOLUTION SUBCUTANEOUS
Qty: 9 | Refills: 0 | Status: ACTIVE | COMMUNITY
Start: 2021-01-01

## 2021-01-15 ENCOUNTER — LABORATORY RESULT (OUTPATIENT)
Age: 71
End: 2021-01-15

## 2021-01-15 ENCOUNTER — APPOINTMENT (OUTPATIENT)
Dept: PULMONOLOGY | Facility: CLINIC | Age: 71
End: 2021-01-15
Payer: MEDICARE

## 2021-01-15 VITALS
SYSTOLIC BLOOD PRESSURE: 168 MMHG | TEMPERATURE: 98.2 F | DIASTOLIC BLOOD PRESSURE: 73 MMHG | OXYGEN SATURATION: 98 % | HEART RATE: 56 BPM

## 2021-01-15 VITALS — TEMPERATURE: 97.2 F

## 2021-01-15 VITALS — DIASTOLIC BLOOD PRESSURE: 70 MMHG | SYSTOLIC BLOOD PRESSURE: 140 MMHG

## 2021-01-15 DIAGNOSIS — E66.9 OBESITY, UNSPECIFIED: ICD-10-CM

## 2021-01-15 DIAGNOSIS — F32.9 MAJOR DEPRESSIVE DISORDER, SINGLE EPISODE, UNSPECIFIED: ICD-10-CM

## 2021-01-15 PROCEDURE — 36415 COLL VENOUS BLD VENIPUNCTURE: CPT

## 2021-01-15 PROCEDURE — 99214 OFFICE O/P EST MOD 30 MIN: CPT | Mod: 25

## 2021-01-15 RX ORDER — METOPROLOL SUCCINATE 25 MG/1
25 TABLET, EXTENDED RELEASE ORAL
Qty: 30 | Refills: 5 | Status: ACTIVE | COMMUNITY
Start: 2021-01-15

## 2021-01-15 RX ORDER — PIOGLITAZONE 15 MG/1
15 TABLET ORAL
Refills: 0 | Status: DISCONTINUED | COMMUNITY
Start: 2020-09-25 | End: 2021-01-15

## 2021-01-17 NOTE — HISTORY OF PRESENT ILLNESS
[TextBox_4] : Colonoscopy 5 years ago, wants to do cologaurd\par Optho went 2020 going back in 6 months\par Derm not recent\par saw urologist    Dr Mark Anthony Schaffer no PSA\par saw cardiologist place on metoprolol 25 for episode of fast heart rate\par \par hem aic 7.3 last week, Dr ty\par \par \par

## 2021-01-17 NOTE — DISCUSSION/SUMMARY
[FreeTextEntry1] : RADHA Patient compliant to CPAP therapy and having positive clinical response to treatment. Continue present settings.\par DM\par Anxiety\par HTN with mildly elevated BP\par Hyperlipidemia on treatment protocol including medications, diet, and exercise program as tolerated.\par Continue present therapy\par Obesity\par Benign prostatic hypertrophy followed by urology.

## 2021-01-17 NOTE — REVIEW OF SYSTEMS
[Depression] : depression [Diabetes] : diabetes [Negative] : Neurologic [TextBox_44] : HTN, hyperlipemia

## 2021-01-17 NOTE — PHYSICAL EXAM
[General Appearance - Well Developed] : well developed [General Appearance - Well Nourished] : well nourished [Normal Conjunctiva] : the conjunctiva exhibited no abnormalities [Jugular Venous Distention Increased] : there was no jugular-venous distention [Heart Sounds] : normal S1 and S2 [Murmurs] : no murmurs present [Auscultation Breath Sounds / Voice Sounds] : lungs were clear to auscultation bilaterally [Lungs Percussion] : the lungs were normal to percussion [Abdomen Soft] : soft [Abdomen Tenderness] : non-tender [Abdomen Mass (___ Cm)] : no abdominal mass palpated [Cyanosis, Localized] : no localized cyanosis [Nail Clubbing] : no clubbing of the fingernails [Petechial Hemorrhages (___cm)] : no petechial hemorrhages [] : no ischemic changes [Supple] : supple [Thyroid Not Enlarged] : thyroid not enlarged [No JVD] : no jvd [No Thyroid Nodules] : no thyroid nodules [Normal S1, S2] : normal s1, s2 [Clear to Auscultation Bilaterally] : clear to auscultation bilaterally [No Murmurs] : no murmurs [Normal to Percussion] : normal to percussion [No Abnormalities] : no abnormalities [Benign] : benign [Not Tender] : not tender [No Masses] : no masses [No HSM] : no hsm [No Clubbing] : no clubbing [No Cyanosis] : no cyanosis [No Edema] : no edema [Oriented x3] : oriented x3 [TextBox_2] : Overweight [FreeTextEntry2] : Pulses 1-2 plus [FreeTextEntry1] : Improved dermatitis.

## 2021-01-17 NOTE — ASSESSMENT
[FreeTextEntry1] : \par Significant concern re DM and weight. Discussed. \par Cologaurd \par Medications reviewed and renewed.\par \par \par \par Labs drawn in office today\par

## 2021-01-19 LAB
25(OH)D3 SERPL-MCNC: 33.8 NG/ML
ALBUMIN SERPL ELPH-MCNC: 4.5 G/DL
ALP BLD-CCNC: 87 U/L
ALT SERPL-CCNC: 33 U/L
ANION GAP SERPL CALC-SCNC: 19 MMOL/L
AST SERPL-CCNC: 28 U/L
BILIRUB DIRECT SERPL-MCNC: 0.1 MG/DL
BILIRUB INDIRECT SERPL-MCNC: 0.3 MG/DL
BILIRUB SERPL-MCNC: 0.4 MG/DL
BUN SERPL-MCNC: 21 MG/DL
CALCIUM SERPL-MCNC: 9.8 MG/DL
CHLORIDE SERPL-SCNC: 103 MMOL/L
CHOLEST SERPL-MCNC: 159 MG/DL
CO2 SERPL-SCNC: 21 MMOL/L
CREAT SERPL-MCNC: 1.05 MG/DL
GLUCOSE SERPL-MCNC: 158 MG/DL
HDLC SERPL-MCNC: 34 MG/DL
LDLC SERPL CALC-MCNC: 87 MG/DL
NONHDLC SERPL-MCNC: 125 MG/DL
POTASSIUM SERPL-SCNC: 4.1 MMOL/L
PROT SERPL-MCNC: 7 G/DL
PSA SERPL-MCNC: 2.54 NG/ML
PSA SERPL-MCNC: 2.57 NG/ML
SODIUM SERPL-SCNC: 143 MMOL/L
T3 SERPL-MCNC: 98 NG/DL
T3RU NFR SERPL: 1 TBI
T4 FREE SERPL-MCNC: 1.1 NG/DL
T4 SERPL-MCNC: 7.1 UG/DL
TRIGL SERPL-MCNC: 192 MG/DL
TSH SERPL-ACNC: 2.14 UIU/ML

## 2021-05-12 ENCOUNTER — APPOINTMENT (OUTPATIENT)
Dept: GASTROENTEROLOGY | Facility: CLINIC | Age: 71
End: 2021-05-12
Payer: MEDICARE

## 2021-05-12 VITALS
SYSTOLIC BLOOD PRESSURE: 160 MMHG | HEIGHT: 70 IN | OXYGEN SATURATION: 97 % | BODY MASS INDEX: 41.37 KG/M2 | WEIGHT: 289 LBS | DIASTOLIC BLOOD PRESSURE: 72 MMHG | HEART RATE: 83 BPM | TEMPERATURE: 97.3 F

## 2021-05-12 DIAGNOSIS — Z12.11 ENCOUNTER FOR SCREENING FOR MALIGNANT NEOPLASM OF COLON: ICD-10-CM

## 2021-05-12 PROCEDURE — 99203 OFFICE O/P NEW LOW 30 MIN: CPT

## 2021-05-12 NOTE — ASSESSMENT
[FreeTextEntry1] : \par GERD - frequent and chronic (at least every other day)\par Reasonable to check EGD at time of colonoscopy to r/o Wilson's\par \par Screening colonoscopy\par Discussed risks of the procedure -  including but not limited to bleeding, infection, drug reaction, perforation and missed lesion.  Also discussed benefits and alternatives of this procedure with patient - including no treatment  - and the patient consents to undergoing the procedure.\par \par Given BMI > 40, will have anesthesiologist evaluate airway prior to scheduling in office setting\par

## 2021-05-12 NOTE — HISTORY OF PRESENT ILLNESS
[FreeTextEntry1] : 71 year old man with DM, HTN , \par \par Last colonoscopy 10 years ago =- maybe one polyp?\par Also had an EGD at same time\par Has gas after BMs- can get dizzy at times\par \par Gets GERD - takes TUMS\par No dysphagia\par \par Was given imipramine - helped with his symptoms\par \par Has regular BMs; may hjave 2-3/day, can be loose\par \par No abd pain - has cramps that go away with BM\par No BRBPR\par

## 2021-07-11 ENCOUNTER — APPOINTMENT (OUTPATIENT)
Dept: DISASTER EMERGENCY | Facility: CLINIC | Age: 71
End: 2021-07-11

## 2021-07-14 ENCOUNTER — APPOINTMENT (OUTPATIENT)
Dept: GASTROENTEROLOGY | Facility: CLINIC | Age: 71
End: 2021-07-14
Payer: MEDICARE

## 2021-07-14 ENCOUNTER — LABORATORY RESULT (OUTPATIENT)
Age: 71
End: 2021-07-14

## 2021-07-14 PROCEDURE — 43239 EGD BIOPSY SINGLE/MULTIPLE: CPT

## 2021-07-14 PROCEDURE — 45380 COLONOSCOPY AND BIOPSY: CPT

## 2021-08-27 ENCOUNTER — APPOINTMENT (OUTPATIENT)
Dept: GASTROENTEROLOGY | Facility: CLINIC | Age: 71
End: 2021-08-27

## 2021-09-14 ENCOUNTER — APPOINTMENT (OUTPATIENT)
Dept: GASTROENTEROLOGY | Facility: CLINIC | Age: 71
End: 2021-09-14

## 2021-11-22 PROBLEM — Z23 ENCOUNTER FOR IMMUNIZATION: Status: ACTIVE | Noted: 2018-10-02

## 2021-11-23 NOTE — ASSESSMENT
[FreeTextEntry1] : f/u GI\par Discuss Ozempic with GI and endocrine.\par \par Medications reviewed and renewed.\par \par \par r/t PE\par \par \par \par

## 2021-11-23 NOTE — PHYSICAL EXAM
[Supple] : supple [Thyroid Not Enlarged] : thyroid not enlarged [No JVD] : no jvd [No Thyroid Nodules] : no thyroid nodules [Normal S1, S2] : normal s1, s2 [No Murmurs] : no murmurs [Clear to Auscultation Bilaterally] : clear to auscultation bilaterally [Normal to Percussion] : normal to percussion [No Abnormalities] : no abnormalities [Benign] : benign [Not Tender] : not tender [No Masses] : no masses [No HSM] : no hsm [No Clubbing] : no clubbing [No Cyanosis] : no cyanosis [No Edema] : no edema [Oriented x3] : oriented x3 [General Appearance - Well Developed] : well developed [General Appearance - Well Nourished] : well nourished [Normal Conjunctiva] : the conjunctiva exhibited no abnormalities [Jugular Venous Distention Increased] : there was no jugular-venous distention [Heart Sounds] : normal S1 and S2 [Murmurs] : no murmurs present [Auscultation Breath Sounds / Voice Sounds] : lungs were clear to auscultation bilaterally [Lungs Percussion] : the lungs were normal to percussion [Abdomen Soft] : soft [Abdomen Tenderness] : non-tender [Abdomen Mass (___ Cm)] : no abdominal mass palpated [Nail Clubbing] : no clubbing of the fingernails [Cyanosis, Localized] : no localized cyanosis [Petechial Hemorrhages (___cm)] : no petechial hemorrhages [] : no ischemic changes [TextBox_2] : Overweight

## 2021-11-23 NOTE — HISTORY OF PRESENT ILLNESS
[TextBox_4] : \par seeing Dr Carr sugar stable , hem aic 7.1 now on ozempic, has been on for past year, 3 months ago had increased dose\par \par c/o increase gas, burping after eating,for the past year. Also some increase in flatulance. \par \par seeing GI tomorrow, DR Hu on [facility], had endo with hiatal  hernia and colonoscopy , 2 polyps removed\par was put on PPI for 30 days but ran out, will ask him if should go on again because has cont upper GI c/o\par \par got booster\par \par

## 2021-11-23 NOTE — DISCUSSION/SUMMARY
[FreeTextEntry1] : RADHA Patient compliant to CPAP therapy and having positive clinical response to treatment. Continue present settings.\par DM\par Anxiety\par HTN with mildly elevated BP\par Hyperlipidemia on treatment protocol including medications, diet, and exercise program as tolerated.\par Continue present therapy\par Obesity\par Benign prostatic hypertrophy followed by urology.\par Increase abdominal bloating/belching.  Possibilities include gastroparesis.  Would also consider possibility related to Ozempic.  Lastly cannot exclude component related to CPAP although has been on this modality for years.

## 2021-11-24 PROBLEM — K59.00 CONSTIPATION: Status: ACTIVE | Noted: 2021-01-01

## 2021-11-24 NOTE — HISTORY OF PRESENT ILLNESS
[FreeTextEntry1] : \par For over a year - gets an attack of gas, shama after a BM; burping\par Gets hoarse\par  Most days, some days worse than others\par \par Seems to have started with the Ozempic

## 2021-12-07 NOTE — ED CDU PROVIDER INITIAL DAY NOTE - CARDIAC, MLM
What Type Of Note Output Would You Prefer (Optional)?: Standard Output How Severe Are Your Spot(S)?: mild Have Your Spot(S) Been Treated In The Past?: has not been treated Hpi Title: Evaluation of a Skin Lesion Normal rate, regular rhythm.  Heart sounds S1, S2.  No murmurs, rubs or gallops.

## 2022-01-01 ENCOUNTER — TRANSCRIPTION ENCOUNTER (OUTPATIENT)
Age: 72
End: 2022-01-01

## 2022-01-01 ENCOUNTER — RX RENEWAL (OUTPATIENT)
Age: 72
End: 2022-01-01

## 2022-01-01 ENCOUNTER — OUTPATIENT (OUTPATIENT)
Dept: OUTPATIENT SERVICES | Facility: HOSPITAL | Age: 72
LOS: 1 days | End: 2022-01-01

## 2022-01-01 ENCOUNTER — NON-APPOINTMENT (OUTPATIENT)
Age: 72
End: 2022-01-01

## 2022-01-01 ENCOUNTER — EMERGENCY (EMERGENCY)
Facility: HOSPITAL | Age: 72
LOS: 1 days | Discharge: HOME | End: 2022-01-01
Attending: EMERGENCY MEDICINE | Admitting: EMERGENCY MEDICINE

## 2022-01-01 ENCOUNTER — APPOINTMENT (OUTPATIENT)
Dept: GASTROENTEROLOGY | Facility: CLINIC | Age: 72
End: 2022-01-01
Payer: MEDICARE

## 2022-01-01 ENCOUNTER — APPOINTMENT (OUTPATIENT)
Dept: NUCLEAR MEDICINE | Facility: HOSPITAL | Age: 72
End: 2022-01-01
Payer: MEDICARE

## 2022-01-01 ENCOUNTER — APPOINTMENT (OUTPATIENT)
Dept: PULMONOLOGY | Facility: CLINIC | Age: 72
End: 2022-01-01
Payer: MEDICARE

## 2022-01-01 ENCOUNTER — APPOINTMENT (OUTPATIENT)
Dept: GASTROENTEROLOGY | Facility: CLINIC | Age: 72
End: 2022-01-01

## 2022-01-01 ENCOUNTER — APPOINTMENT (OUTPATIENT)
Dept: ELECTROPHYSIOLOGY | Facility: CLINIC | Age: 72
End: 2022-01-01

## 2022-01-01 ENCOUNTER — INPATIENT (INPATIENT)
Facility: HOSPITAL | Age: 72
LOS: 0 days | Discharge: NURSING, REHAB OR LONG-TERM CARE | End: 2022-09-22
Attending: STUDENT IN AN ORGANIZED HEALTH CARE EDUCATION/TRAINING PROGRAM | Admitting: STUDENT IN AN ORGANIZED HEALTH CARE EDUCATION/TRAINING PROGRAM

## 2022-01-01 ENCOUNTER — APPOINTMENT (OUTPATIENT)
Dept: CT IMAGING | Facility: IMAGING CENTER | Age: 72
End: 2022-01-01

## 2022-01-01 ENCOUNTER — OUTPATIENT (OUTPATIENT)
Dept: OUTPATIENT SERVICES | Facility: HOSPITAL | Age: 72
LOS: 1 days | End: 2022-01-01
Payer: MEDICARE

## 2022-01-01 VITALS
HEART RATE: 75 BPM | WEIGHT: 272 LBS | HEIGHT: 70 IN | DIASTOLIC BLOOD PRESSURE: 70 MMHG | TEMPERATURE: 98.1 F | SYSTOLIC BLOOD PRESSURE: 140 MMHG | BODY MASS INDEX: 38.94 KG/M2 | OXYGEN SATURATION: 96 %

## 2022-01-01 VITALS
DIASTOLIC BLOOD PRESSURE: 75 MMHG | HEIGHT: 70 IN | HEART RATE: 66 BPM | SYSTOLIC BLOOD PRESSURE: 149 MMHG | OXYGEN SATURATION: 95 % | BODY MASS INDEX: 38.74 KG/M2 | TEMPERATURE: 97.9 F

## 2022-01-01 VITALS
DIASTOLIC BLOOD PRESSURE: 76 MMHG | SYSTOLIC BLOOD PRESSURE: 141 MMHG | HEIGHT: 72 IN | OXYGEN SATURATION: 100 % | TEMPERATURE: 98 F | HEART RATE: 87 BPM | RESPIRATION RATE: 18 BRPM

## 2022-01-01 VITALS
SYSTOLIC BLOOD PRESSURE: 148 MMHG | DIASTOLIC BLOOD PRESSURE: 65 MMHG | OXYGEN SATURATION: 99 % | HEART RATE: 64 BPM | TEMPERATURE: 98 F | RESPIRATION RATE: 18 BRPM

## 2022-01-01 VITALS
DIASTOLIC BLOOD PRESSURE: 68 MMHG | SYSTOLIC BLOOD PRESSURE: 167 MMHG | WEIGHT: 280 LBS | OXYGEN SATURATION: 96 % | TEMPERATURE: 98 F | HEIGHT: 70 IN | HEART RATE: 60 BPM | BODY MASS INDEX: 40.09 KG/M2

## 2022-01-01 VITALS — DIASTOLIC BLOOD PRESSURE: 76 MMHG | SYSTOLIC BLOOD PRESSURE: 146 MMHG

## 2022-01-01 VITALS
RESPIRATION RATE: 18 BRPM | TEMPERATURE: 98 F | DIASTOLIC BLOOD PRESSURE: 59 MMHG | SYSTOLIC BLOOD PRESSURE: 149 MMHG | OXYGEN SATURATION: 97 % | HEART RATE: 62 BPM

## 2022-01-01 VITALS
HEIGHT: 72 IN | DIASTOLIC BLOOD PRESSURE: 68 MMHG | TEMPERATURE: 98 F | OXYGEN SATURATION: 99 % | SYSTOLIC BLOOD PRESSURE: 141 MMHG | HEART RATE: 89 BPM | RESPIRATION RATE: 18 BRPM

## 2022-01-01 VITALS — BODY MASS INDEX: 38.74 KG/M2 | WEIGHT: 270 LBS

## 2022-01-01 DIAGNOSIS — Z98.89 OTHER SPECIFIED POSTPROCEDURAL STATES: Chronic | ICD-10-CM

## 2022-01-01 DIAGNOSIS — G47.33 OBSTRUCTIVE SLEEP APNEA (ADULT) (PEDIATRIC): ICD-10-CM

## 2022-01-01 DIAGNOSIS — F41.9 ANXIETY DISORDER, UNSPECIFIED: ICD-10-CM

## 2022-01-01 DIAGNOSIS — S82.143A DISPLACED BICONDYLAR FRACTURE OF UNSPECIFIED TIBIA, INITIAL ENCOUNTER FOR CLOSED FRACTURE: ICD-10-CM

## 2022-01-01 DIAGNOSIS — K21.9 GASTRO-ESOPHAGEAL REFLUX DISEASE WITHOUT ESOPHAGITIS: ICD-10-CM

## 2022-01-01 DIAGNOSIS — I10 ESSENTIAL (PRIMARY) HYPERTENSION: ICD-10-CM

## 2022-01-01 DIAGNOSIS — R14.2 ERUCTATION: ICD-10-CM

## 2022-01-01 DIAGNOSIS — E78.5 HYPERLIPIDEMIA, UNSPECIFIED: ICD-10-CM

## 2022-01-01 DIAGNOSIS — K21.9 GASTRO-ESOPHAGEAL REFLUX DISEASE W/OUT ESOPHAGITIS: ICD-10-CM

## 2022-01-01 DIAGNOSIS — Z29.9 ENCOUNTER FOR PROPHYLACTIC MEASURES, UNSPECIFIED: ICD-10-CM

## 2022-01-01 DIAGNOSIS — E11.9 TYPE 2 DIABETES MELLITUS W/OUT COMPLICATIONS: ICD-10-CM

## 2022-01-01 DIAGNOSIS — Z00.8 ENCOUNTER FOR OTHER GENERAL EXAMINATION: ICD-10-CM

## 2022-01-01 DIAGNOSIS — Z95.0 PRESENCE OF CARDIAC PACEMAKER: ICD-10-CM

## 2022-01-01 DIAGNOSIS — E11.9 TYPE 2 DIABETES MELLITUS WITHOUT COMPLICATIONS: ICD-10-CM

## 2022-01-01 DIAGNOSIS — R55 SYNCOPE AND COLLAPSE: ICD-10-CM

## 2022-01-01 DIAGNOSIS — Z00.00 ENCOUNTER FOR GENERAL ADULT MEDICAL EXAMINATION W/OUT ABNORMAL FINDINGS: ICD-10-CM

## 2022-01-01 DIAGNOSIS — Z99.89 OBSTRUCTIVE SLEEP APNEA (ADULT) (PEDIATRIC): ICD-10-CM

## 2022-01-01 DIAGNOSIS — R14.0 ABDOMINAL DISTENSION (GASEOUS): ICD-10-CM

## 2022-01-01 DIAGNOSIS — N40.0 BENIGN PROSTATIC HYPERPLASIA WITHOUT LOWER URINARY TRACT SYMPMS: ICD-10-CM

## 2022-01-01 DIAGNOSIS — R26.2 DIFFICULTY IN WALKING, NOT ELSEWHERE CLASSIFIED: ICD-10-CM

## 2022-01-01 LAB
ALBUMIN SERPL ELPH-MCNC: 4.4 G/DL — SIGNIFICANT CHANGE UP (ref 3.3–5)
ALP SERPL-CCNC: 105 U/L — SIGNIFICANT CHANGE UP (ref 40–120)
ALT FLD-CCNC: 18 U/L — SIGNIFICANT CHANGE UP (ref 4–41)
ANION GAP SERPL CALC-SCNC: 17 MMOL/L — HIGH (ref 7–14)
AST SERPL-CCNC: 24 U/L — SIGNIFICANT CHANGE UP (ref 4–40)
BASOPHILS # BLD AUTO: 0.04 K/UL — SIGNIFICANT CHANGE UP (ref 0–0.2)
BASOPHILS NFR BLD AUTO: 0.4 % — SIGNIFICANT CHANGE UP (ref 0–2)
BILIRUB SERPL-MCNC: 1 MG/DL — SIGNIFICANT CHANGE UP (ref 0.2–1.2)
BLD GP AB SCN SERPL QL: NEGATIVE — SIGNIFICANT CHANGE UP
BUN SERPL-MCNC: 32 MG/DL — HIGH (ref 7–23)
CALCIUM SERPL-MCNC: 9.7 MG/DL — SIGNIFICANT CHANGE UP (ref 8.4–10.5)
CHLORIDE SERPL-SCNC: 96 MMOL/L — LOW (ref 98–107)
CO2 SERPL-SCNC: 26 MMOL/L — SIGNIFICANT CHANGE UP (ref 22–31)
CREAT SERPL-MCNC: 1.13 MG/DL — SIGNIFICANT CHANGE UP (ref 0.5–1.3)
EGFR: 69 ML/MIN/1.73M2 — SIGNIFICANT CHANGE UP
EOSINOPHIL # BLD AUTO: 0.11 K/UL — SIGNIFICANT CHANGE UP (ref 0–0.5)
EOSINOPHIL NFR BLD AUTO: 1.2 % — SIGNIFICANT CHANGE UP (ref 0–6)
GLUCOSE BLDC GLUCOMTR-MCNC: 141 MG/DL — HIGH (ref 70–99)
GLUCOSE SERPL-MCNC: 155 MG/DL — HIGH (ref 70–99)
HCT VFR BLD CALC: 43.1 % — SIGNIFICANT CHANGE UP (ref 39–50)
HGB BLD-MCNC: 14.1 G/DL — SIGNIFICANT CHANGE UP (ref 13–17)
IANC: 5.34 K/UL — SIGNIFICANT CHANGE UP (ref 1.8–7.4)
IMM GRANULOCYTES NFR BLD AUTO: 0.4 % — SIGNIFICANT CHANGE UP (ref 0–0.9)
INR BLD: 1.26 RATIO — HIGH (ref 0.88–1.16)
LYMPHOCYTES # BLD AUTO: 2.66 K/UL — SIGNIFICANT CHANGE UP (ref 1–3.3)
LYMPHOCYTES # BLD AUTO: 29.6 % — SIGNIFICANT CHANGE UP (ref 13–44)
MCHC RBC-ENTMCNC: 27.6 PG — SIGNIFICANT CHANGE UP (ref 27–34)
MCHC RBC-ENTMCNC: 32.7 GM/DL — SIGNIFICANT CHANGE UP (ref 32–36)
MCV RBC AUTO: 84.3 FL — SIGNIFICANT CHANGE UP (ref 80–100)
MONOCYTES # BLD AUTO: 0.8 K/UL — SIGNIFICANT CHANGE UP (ref 0–0.9)
MONOCYTES NFR BLD AUTO: 8.9 % — SIGNIFICANT CHANGE UP (ref 2–14)
NEUTROPHILS # BLD AUTO: 5.34 K/UL — SIGNIFICANT CHANGE UP (ref 1.8–7.4)
NEUTROPHILS NFR BLD AUTO: 59.5 % — SIGNIFICANT CHANGE UP (ref 43–77)
NRBC # BLD: 0 /100 WBCS — SIGNIFICANT CHANGE UP (ref 0–0)
NRBC # FLD: 0 K/UL — SIGNIFICANT CHANGE UP (ref 0–0)
PLATELET # BLD AUTO: 253 K/UL — SIGNIFICANT CHANGE UP (ref 150–400)
POTASSIUM SERPL-MCNC: 3.1 MMOL/L — LOW (ref 3.5–5.3)
POTASSIUM SERPL-SCNC: 3.1 MMOL/L — LOW (ref 3.5–5.3)
PROT SERPL-MCNC: 8.1 G/DL — SIGNIFICANT CHANGE UP (ref 6–8.3)
PROTHROM AB SERPL-ACNC: 14.7 SEC — HIGH (ref 10.5–13.4)
RBC # BLD: 5.11 M/UL — SIGNIFICANT CHANGE UP (ref 4.2–5.8)
RBC # FLD: 14.4 % — SIGNIFICANT CHANGE UP (ref 10.3–14.5)
RH IG SCN BLD-IMP: NEGATIVE — SIGNIFICANT CHANGE UP
SARS-COV-2 RNA SPEC QL NAA+PROBE: SIGNIFICANT CHANGE UP
SODIUM SERPL-SCNC: 139 MMOL/L — SIGNIFICANT CHANGE UP (ref 135–145)
WBC # BLD: 8.99 K/UL — SIGNIFICANT CHANGE UP (ref 3.8–10.5)
WBC # FLD AUTO: 8.99 K/UL — SIGNIFICANT CHANGE UP (ref 3.8–10.5)

## 2022-01-01 PROCEDURE — 70450 CT HEAD/BRAIN W/O DYE: CPT | Mod: 26,MH

## 2022-01-01 PROCEDURE — G1004: CPT

## 2022-01-01 PROCEDURE — 78264 GASTRIC EMPTYING IMG STUDY: CPT | Mod: 26,ME

## 2022-01-01 PROCEDURE — 12345: CPT | Mod: NC

## 2022-01-01 PROCEDURE — 99214 OFFICE O/P EST MOD 30 MIN: CPT

## 2022-01-01 PROCEDURE — 73552 X-RAY EXAM OF FEMUR 2/>: CPT | Mod: 26,LT

## 2022-01-01 PROCEDURE — 73590 X-RAY EXAM OF LOWER LEG: CPT | Mod: 26,LT

## 2022-01-01 PROCEDURE — 73610 X-RAY EXAM OF ANKLE: CPT | Mod: 26,LT

## 2022-01-01 PROCEDURE — 99213 OFFICE O/P EST LOW 20 MIN: CPT

## 2022-01-01 PROCEDURE — 73564 X-RAY EXAM KNEE 4 OR MORE: CPT | Mod: 26,LT

## 2022-01-01 PROCEDURE — 93000 ELECTROCARDIOGRAM COMPLETE: CPT

## 2022-01-01 PROCEDURE — 73700 CT LOWER EXTREMITY W/O DYE: CPT | Mod: 26,LT,MG

## 2022-01-01 PROCEDURE — 70450 CT HEAD/BRAIN W/O DYE: CPT | Mod: MH

## 2022-01-01 PROCEDURE — 99223 1ST HOSP IP/OBS HIGH 75: CPT

## 2022-01-01 PROCEDURE — 73110 X-RAY EXAM OF WRIST: CPT | Mod: 26,RT

## 2022-01-01 PROCEDURE — 99285 EMERGENCY DEPT VISIT HI MDM: CPT | Mod: FS

## 2022-01-01 PROCEDURE — 99283 EMERGENCY DEPT VISIT LOW MDM: CPT

## 2022-01-01 PROCEDURE — 73502 X-RAY EXAM HIP UNI 2-3 VIEWS: CPT | Mod: 26,LT

## 2022-01-01 RX ORDER — INSULIN LISPRO 100/ML
VIAL (ML) SUBCUTANEOUS AT BEDTIME
Refills: 0 | Status: DISCONTINUED | OUTPATIENT
Start: 2022-01-01 | End: 2022-01-01

## 2022-01-01 RX ORDER — DEXTROSE 50 % IN WATER 50 %
25 SYRINGE (ML) INTRAVENOUS ONCE
Refills: 0 | Status: DISCONTINUED | OUTPATIENT
Start: 2022-01-01 | End: 2022-01-01

## 2022-01-01 RX ORDER — DEXTROSE 50 % IN WATER 50 %
12.5 SYRINGE (ML) INTRAVENOUS ONCE
Refills: 0 | Status: DISCONTINUED | OUTPATIENT
Start: 2022-01-01 | End: 2022-01-01

## 2022-01-01 RX ORDER — METOPROLOL TARTRATE 50 MG
1 TABLET ORAL
Qty: 0 | Refills: 0 | DISCHARGE

## 2022-01-01 RX ORDER — POLYETHYLENE GLYCOL 3350 17 G/17G
17 POWDER, FOR SOLUTION ORAL DAILY
Qty: 510 | Refills: 1 | Status: DISCONTINUED | COMMUNITY
Start: 2021-01-01 | End: 2022-01-01

## 2022-01-01 RX ORDER — METOPROLOL TARTRATE 50 MG
50 TABLET ORAL DAILY
Refills: 0 | Status: DISCONTINUED | OUTPATIENT
Start: 2022-01-01 | End: 2022-01-01

## 2022-01-01 RX ORDER — ACETAMINOPHEN 500 MG
2 TABLET ORAL
Qty: 0 | Refills: 0 | DISCHARGE
Start: 2022-01-01

## 2022-01-01 RX ORDER — AMLODIPINE BESYLATE 2.5 MG/1
10 TABLET ORAL DAILY
Refills: 0 | Status: DISCONTINUED | OUTPATIENT
Start: 2022-01-01 | End: 2022-01-01

## 2022-01-01 RX ORDER — SERTRALINE 25 MG/1
100 TABLET, FILM COATED ORAL DAILY
Refills: 0 | Status: DISCONTINUED | OUTPATIENT
Start: 2022-01-01 | End: 2022-01-01

## 2022-01-01 RX ORDER — SODIUM CHLORIDE 9 MG/ML
1000 INJECTION, SOLUTION INTRAVENOUS
Refills: 0 | Status: DISCONTINUED | OUTPATIENT
Start: 2022-01-01 | End: 2022-01-01

## 2022-01-01 RX ORDER — SEMAGLUTIDE 0.68 MG/ML
0 INJECTION, SOLUTION SUBCUTANEOUS
Qty: 0 | Refills: 0 | DISCHARGE

## 2022-01-01 RX ORDER — LANOLIN ALCOHOL/MO/W.PET/CERES
3 CREAM (GRAM) TOPICAL AT BEDTIME
Refills: 0 | Status: DISCONTINUED | OUTPATIENT
Start: 2022-01-01 | End: 2022-01-01

## 2022-01-01 RX ORDER — BROMOCRIPTINE MESYLATE 0.8 MG/1
TABLET ORAL
Refills: 0 | Status: DISCONTINUED | COMMUNITY
End: 2022-01-01

## 2022-01-01 RX ORDER — OMEPRAZOLE 10 MG/1
1 CAPSULE, DELAYED RELEASE ORAL
Qty: 0 | Refills: 0 | DISCHARGE

## 2022-01-01 RX ORDER — ATORVASTATIN CALCIUM 80 MG/1
80 TABLET, FILM COATED ORAL AT BEDTIME
Refills: 0 | Status: DISCONTINUED | OUTPATIENT
Start: 2022-01-01 | End: 2022-01-01

## 2022-01-01 RX ORDER — PANTOPRAZOLE SODIUM 20 MG/1
40 TABLET, DELAYED RELEASE ORAL
Refills: 0 | Status: DISCONTINUED | OUTPATIENT
Start: 2022-01-01 | End: 2022-01-01

## 2022-01-01 RX ORDER — ALPRAZOLAM 0.25 MG
1 TABLET ORAL EVERY 8 HOURS
Refills: 0 | Status: DISCONTINUED | OUTPATIENT
Start: 2022-01-01 | End: 2022-01-01

## 2022-01-01 RX ORDER — HYDRALAZINE HYDROCHLORIDE 25 MG/1
25 TABLET ORAL 3 TIMES DAILY
Qty: 90 | Refills: 0 | Status: ACTIVE | COMMUNITY
Start: 2021-07-13

## 2022-01-01 RX ORDER — AMLODIPINE BESYLATE 2.5 MG/1
1 TABLET ORAL
Qty: 0 | Refills: 0 | DISCHARGE

## 2022-01-01 RX ORDER — METFORMIN HYDROCHLORIDE 850 MG/1
1 TABLET ORAL
Qty: 0 | Refills: 0 | DISCHARGE

## 2022-01-01 RX ORDER — INSULIN LISPRO 100/ML
VIAL (ML) SUBCUTANEOUS
Refills: 0 | Status: DISCONTINUED | OUTPATIENT
Start: 2022-01-01 | End: 2022-01-01

## 2022-01-01 RX ORDER — POTASSIUM CHLORIDE 20 MEQ
40 PACKET (EA) ORAL ONCE
Refills: 0 | Status: COMPLETED | OUTPATIENT
Start: 2022-01-01 | End: 2022-01-01

## 2022-01-01 RX ORDER — HYDROCHLOROTHIAZIDE 25 MG
25 TABLET ORAL DAILY
Refills: 0 | Status: DISCONTINUED | OUTPATIENT
Start: 2022-01-01 | End: 2022-01-01

## 2022-01-01 RX ORDER — GLUCAGON INJECTION, SOLUTION 0.5 MG/.1ML
1 INJECTION, SOLUTION SUBCUTANEOUS ONCE
Refills: 0 | Status: DISCONTINUED | OUTPATIENT
Start: 2022-01-01 | End: 2022-01-01

## 2022-01-01 RX ORDER — HYDRALAZINE HCL 50 MG
1 TABLET ORAL
Qty: 0 | Refills: 0 | DISCHARGE

## 2022-01-01 RX ORDER — ALPRAZOLAM 0.25 MG
1 TABLET ORAL
Qty: 0 | Refills: 0 | DISCHARGE

## 2022-01-01 RX ORDER — DEXTROSE 50 % IN WATER 50 %
15 SYRINGE (ML) INTRAVENOUS ONCE
Refills: 0 | Status: DISCONTINUED | OUTPATIENT
Start: 2022-01-01 | End: 2022-01-01

## 2022-01-01 RX ORDER — HYDRALAZINE HCL 50 MG
25 TABLET ORAL THREE TIMES A DAY
Refills: 0 | Status: DISCONTINUED | OUTPATIENT
Start: 2022-01-01 | End: 2022-01-01

## 2022-01-01 RX ORDER — ENOXAPARIN SODIUM 100 MG/ML
40 INJECTION SUBCUTANEOUS EVERY 24 HOURS
Refills: 0 | Status: DISCONTINUED | OUTPATIENT
Start: 2022-01-01 | End: 2022-01-01

## 2022-01-01 RX ORDER — VITAMIN K2 90 MCG
125 MCG CAPSULE ORAL
Refills: 0 | Status: DISCONTINUED | COMMUNITY
End: 2022-01-01

## 2022-01-01 RX ORDER — ACETAMINOPHEN 500 MG
650 TABLET ORAL EVERY 6 HOURS
Refills: 0 | Status: DISCONTINUED | OUTPATIENT
Start: 2022-01-01 | End: 2022-01-01

## 2022-01-01 RX ADMIN — AMLODIPINE BESYLATE 10 MILLIGRAM(S): 2.5 TABLET ORAL at 11:53

## 2022-01-01 RX ADMIN — Medication 25 MILLIGRAM(S): at 11:55

## 2022-01-01 RX ADMIN — SERTRALINE 100 MILLIGRAM(S): 25 TABLET, FILM COATED ORAL at 11:56

## 2022-01-01 RX ADMIN — Medication 0: at 12:56

## 2022-01-01 RX ADMIN — Medication 40 MILLIEQUIVALENT(S): at 01:23

## 2022-01-01 RX ADMIN — Medication 1: at 09:27

## 2022-01-01 RX ADMIN — PANTOPRAZOLE SODIUM 40 MILLIGRAM(S): 20 TABLET, DELAYED RELEASE ORAL at 09:15

## 2022-01-01 RX ADMIN — ENOXAPARIN SODIUM 40 MILLIGRAM(S): 100 INJECTION SUBCUTANEOUS at 09:15

## 2022-01-01 RX ADMIN — Medication 50 MILLIGRAM(S): at 11:55

## 2022-01-01 RX ADMIN — Medication 25 MILLIGRAM(S): at 14:09

## 2022-04-15 PROBLEM — F41.9 ANXIETY: Status: ACTIVE | Noted: 2020-04-29

## 2022-04-16 NOTE — DISCUSSION/SUMMARY
[FreeTextEntry1] : RADHA Patient compliant to CPAP therapy and having positive clinical response to treatment. Continue present settings.\par DM followed by endocrinology.\par Anxiety\par HTN with mild elevation of blood pressure.  Will follow.  Continue present therapy at this time.  Seeing cardiology.\par Hyperlipidemia on treatment protocol including medications, diet, and exercise program as tolerated.\par Continue present therapy\par Obesity\par Benign prostatic hypertrophy followed by urology.\par ? gastroparesis

## 2022-04-16 NOTE — PHYSICAL EXAM
[Supple] : supple [Thyroid Not Enlarged] : thyroid not enlarged [No JVD] : no jvd [No Thyroid Nodules] : no thyroid nodules [Normal S1, S2] : normal s1, s2 [No Murmurs] : no murmurs [Clear to Auscultation Bilaterally] : clear to auscultation bilaterally [Normal to Percussion] : normal to percussion [No Abnormalities] : no abnormalities [Benign] : benign [Not Tender] : not tender [No Masses] : no masses [No HSM] : no hsm [No Clubbing] : no clubbing [No Cyanosis] : no cyanosis [No Edema] : no edema [Oriented x3] : oriented x3 [General Appearance - Well Developed] : well developed [General Appearance - Well Nourished] : well nourished [Normal Conjunctiva] : the conjunctiva exhibited no abnormalities [Jugular Venous Distention Increased] : there was no jugular-venous distention [Heart Sounds] : normal S1 and S2 [Murmurs] : no murmurs present [Auscultation Breath Sounds / Voice Sounds] : lungs were clear to auscultation bilaterally [Lungs Percussion] : the lungs were normal to percussion [Abdomen Soft] : soft [Abdomen Tenderness] : non-tender [Abdomen Mass (___ Cm)] : no abdominal mass palpated [Nail Clubbing] : no clubbing of the fingernails [Cyanosis, Localized] : no localized cyanosis [Petechial Hemorrhages (___cm)] : no petechial hemorrhages [] : no ischemic changes [TextBox_2] : Overweight [FreeTextEntry2] : Pulses 1-2 plus [FreeTextEntry1] : Improved dermatitis.

## 2022-04-16 NOTE — ASSESSMENT
[FreeTextEntry1] : ? Jardiance.\par Significant concern re DM and weight. Discussed. \par Medications reviewed and renewed.\par Discuss with GI ? gastroparesis. \par Weight loss recommended and discussed.\par Derm evaluation skin survey.\par will get PSA done next week with endo\par

## 2022-04-16 NOTE — HISTORY OF PRESENT ILLNESS
[TextBox_4] : Colonoscopy 2021 and endoscopy told hiatal hernia and 2 polyps removed, told repeat 5 years \par OpthoY told ok\par Derm not recent\par saw urologist    Dr Mark Anthony Schaffer no PSA\par saw cardiologist place on metoprolol 25 for episode of fast heart rate and had labs told sugar high, b/p was good\par had abdominal sono and echo and carotid last week pending results\par \par gets gas attaches, restarted Metamucil \par \par  Dr ty next week\par \par using cpap machine nightly\par \par Uro a few months ago\par \par

## 2022-04-19 PROBLEM — K21.9 ESOPHAGEAL REFLUX: Status: ACTIVE | Noted: 2021-05-12

## 2022-04-19 PROBLEM — R14.2 BELCHING: Status: ACTIVE | Noted: 2021-01-01

## 2022-05-18 NOTE — ED ADULT TRIAGE NOTE - NS ED NURSE AMBULANCES
[facility]
Was on medication, currently no therapy, counseling and symptoms/anxiety disorder

## 2022-08-19 PROBLEM — Z95.0 CARDIAC PACEMAKER: Status: ACTIVE | Noted: 2022-01-01

## 2022-08-19 PROBLEM — G47.33 OSA ON CPAP: Status: ACTIVE | Noted: 2018-08-28

## 2022-08-19 PROBLEM — R14.0 ABDOMINAL BLOATING: Status: RESOLVED | Noted: 2021-01-01 | Resolved: 2022-01-01

## 2022-08-19 PROBLEM — R55 VASOVAGAL SYNCOPE: Status: ACTIVE | Noted: 2022-01-01

## 2022-08-19 NOTE — DISCUSSION/SUMMARY
[EKG obtained to assist in diagnosis and management of assessed problem(s)] : EKG obtained to assist in diagnosis and management of assessed problem(s) [FreeTextEntry1] : Impression:\par \par 1. 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 EKG performed today to assess for appropriate pacing and reveals NSR with RBBB and LAFB. Recent syncope, likely vagal. No correlating tachyarrhythmias on PPM. Instructed on safety mechanisms such as staying well hydrated, utilizing salt, avoiding prolonged standing with knees locked, and to lie down with feet elevated if symptoms of near syncope occur. Can also consider use of compression socks and should avoid known triggers for syncope such as alcohol and warm or crowded environments. Patient also has diabetes with intermittent paresthesias. Could have a diabetic autonomic neuropathy. Patient will follow-up with primary care doctor. \par \par 2. HTN: resume oral antihypertensives as prescribed. Encouraged heart healthy diet, sodium restriction, and weight loss. Continue regular f/u with Cardiologist for further HTN management.\par \par 3. HLD: resume statin therapy as prescribed and regular f/u with Cardiologist for routine lipid monitoring and management.\par \par 4. RADHA: resume compliance with RADHA management to prevent future sinus node dysfunction and atrial fibrillation. Encouraged weight loss.\par \par Will continue f/u with Cardiologist and may RTO as needed or if any new or worsening symptoms or findings occur.\par \par Sincerely,\par \par Ponce Sims MD

## 2022-08-19 NOTE — HISTORY OF PRESENT ILLNESS
[FreeTextEntry1] : Darin Pearson is a 71y/o man with Hx of HTN, HLD, DMII, GERD, BPH, RADHA on CPAP, syncope, and transient CHB s/p PPM placement (2014) who presents today for routine f/u. Has been doing well but recently while having intercourse he had LOC for about 1 minute in duration. Was also noticeably pale at that time. Recalls feeling himself go down. Has had no other episodes or similar episodes since PPM placement in 2014. Saw Cardiologist and PPM checked without evidence of corresponding tachyarrhythmias. Does struggle with GI issues, belching often. Has known hiatal hernia and follows with GI. Denies chest pain, palpitations, SOB, and no further syncope or near syncope.\par

## 2022-09-19 NOTE — ED PROVIDER NOTE - WR ORDER ID 1
C/o LLE swelling for few months, has been worse past couple of days, spoke to podiatrist 2 days ago who said if swelling does not improve he should come to ED to r/o osteomyelitis and for IV abx. Pt is ambulatory to triage, has gauze in place over wound. Denies fevers/ other complaints. Denies DM/ other PMH.
[id number]

## 2022-09-19 NOTE — ED PROVIDER NOTE - PRINCIPAL DIAGNOSIS
Called and spoke with patient, she states that she was told of the US results and the referral. She states that she is still having trouble with her bilateral feet swelling. She states that it is worse than when she saw Dr. Pradhan on 8/30. She states that they are painful now as well. She would like to know what she can do next.    Fracture of left tibial spine

## 2022-09-19 NOTE — ED PROVIDER NOTE - NSFOLLOWUPINSTRUCTIONS_ED_ALL_ED_FT
Nondisplaced Tibial Plateau Fracture  Image   A tibial plateau fracture is a break in the top of the shin bone (tibia). The top of the tibia has a flat, smooth surface (tibial plateau). This part of the tibia is softer than the rest of the bone. It forms the bottom of the knee joint. If a strong force shoves the thigh bone (femur) down and onto the tibial plateau, the tibial plateau can collapse or break apart at the edges. This may also be called an intra-articular fracture.    A nondisplaced fracture means that the broken pieces of bone have not moved out of their normal position. This type of fracture can usually be treated without surgery.    What are the causes?  Common causes of this type of fracture include:  Car accidents.  Jumps or falls from a significant height.  Injuries from activities that put a lot of force on the knee, such as injuries from skiing, mountain biking, or contact sports.  What increases the risk?  You may be at higher risk for this type of fracture if:  You play sports that put a lot of force on your knee, including contact sports.  You have a history of bone infections.  You are an older person with a condition that causes weak bones (osteoporosis).  What are the signs or symptoms?  Symptoms of a nondisplaced tibial plateau fracture begin right after the injury. They may include:  Pain that gets worse when putting weight on your knee or moving your knee.  Knee swelling and bruising.  How is this diagnosed?  This condition may be diagnosed based on:  Your symptoms and medical history. Your health care provider may ask about recent knee or leg injuries you have had.  A physical exam.  Imaging tests, such as X-rays, CT scan, or MRI.  How is this treated?  This condition is treated by wearing a brace on your leg. You will not be able to put any of your body weight on the leg (weight-bearing restrictions). You may be given crutches, a scooter, a walker, or a wheelchair to help you move around. Treatment may also involve:  Prescription pain medicine.  Physical therapy.  Follow these instructions at home:  Medicines     Take over-the-counter and prescription medicines only as told by your health care provider.  Do not drive or use heavy machinery while taking prescription pain medicine.  If you are taking prescription pain medicine, take actions to prevent or treat constipation. Your health care provider may recommend that you:  Drink enough fluid to keep your urine pale yellow.  Eat foods that are high in fiber, such as fresh fruits and vegetables, whole grains, and beans.  Limit foods that are high in fat and processed sugars, such as fried or sweet foods.  Take an over-the-counter or prescription medicine for constipation.  If you have a brace:     Wear the brace as told by your health care provider. Remove it only as told by your health care provider.  Loosen the brace if your toes tingle, become numb, or turn cold and blue.  Keep the brace clean.  If the brace is not waterproof:  Do not let it get wet.   Cover it with a watertight covering when you take a bath or a shower.  Activity     Do not use your leg to support your body weight until your health care provider says that you can. Follow weight-bearing restrictions.  Use crutches, a cane, or a walker as directed.  Ask your health care provider what activities are safe for you and what activities you need to avoid.  Do physical therapy exercises as directed.  Do not drive while wearing a brace on the leg that you use for driving.  Managing pain, stiffness, and swelling     Image   If directed, put ice on the injured area:  If you have a removable brace, remove it as told by your health care provider.  Put ice in a plastic bag.  Place a towel between your skin and the bag.  Leave the ice on for 20 minutes, 2–3 times a day.  Move your toes and ankle often to avoid stiffness and to lessen swelling.  Raise (elevate) the injured area above the level of your heart while you are sitting or lying down.  General instructions     Do not take baths, swim, or use a hot tub until your health care provider approves. Ask your health care provider if you may take showers. You may only be allowed to take sponge baths.  Do not use any products that contain nicotine or tobacco, such as cigarettes and e-cigarettes. These can delay bone healing. If you need help quitting, ask your health care provider.  Keep all follow-up visits as told by your health care provider. This is important.  Contact a health care provider if:  You have pain that does not get better with medicine.  Get help right away if:  You have severe pain or swelling.  You have new pain, swelling, or warmth in your lower leg.  Your toes or foot:  Are unusually cold.   Turn a bluish color.  Are numb.  You have chest pain.  You have difficulty breathing.  Summary  A tibial plateau fracture is a break in the top of the shin bone (tibia), which forms the bottom of the knee joint.  A nondisplaced fracture means that the broken pieces of bone have not moved out of their normal position.  Do not use your leg to support your body weight until your health care provider says that you can. Follow weight-bearing restrictions.  Keep all follow-up visits as told by your health care provider. This is important.  This information is not intended to replace advice given to you by your health care provider. Make sure you discuss any questions you have with your health care provider.

## 2022-09-19 NOTE — ED PROVIDER NOTE - PATIENT PORTAL LINK FT
You can access the FollowMyHealth Patient Portal offered by [facility] by registering at the following website: [email or web address]. By joining [facility]’s FollowMyHealth portal, you will also be able to view your health information using other applications (apps) compatible with our system.

## 2022-09-19 NOTE — ED ADULT TRIAGE NOTE - RESPIRATORY RATE (BREATHS/MIN)
Discussion/Summary   Viola,   Mild diabetes on your bloodwork. Please come to your visit as we need to discuss this. Also vitamin D is low- please take 1000 units over the counter vitamin D3 every day. Liver/kidneys/cholesterol all look fine. See you at your upcoming visit        Verified Results  COMP METABOLIC PANEL WITH LIPID PANEL AND CBCA (CPNL,CBCA,HDL) [id number] 08:30AM JESSE BANKS   [May 18, 2017 10:15AM JESSE BANKS]  close f/u     Test Name Result Flag Reference   WHITE BLOOD COUNT 3.7 K/mcL L 4.2-11.0   RED CELL COUNT 4.59 mil/mcL  4.00-5.20   HEMOGLOBIN 12.1 g/dl  12.0-15.5   HEMATOCRIT 36.7 %  36.0-46.5   MEAN CORPUSCULAR VOLUME 80.0 fL  78.0-100.0   MEAN CORPUSCULAR HEMOGLOBIN 26.4 pg  26.0-34.0   MEAN CORPUSCULAR HGB CONC 33.0 g/dl  32.0-36.5   RDW-CV 14.9 %  11.0-15.0   PLATELET COUNT 157 K/mcL  140-450   DIFF TYPE      AUTOMATED DIFFERENTIAL   GUIDO% 45 %     LYM% 40 %     MON% 9 %     EOS% 5 %     BASO% 1 %     GUIDO ABS 1.7 K/mcL L 1.8-7.7   LYM ABS 1.5 K/mcL  1.0-4.8   MON ABS 0.3 K/mcL  0.3-0.9   EOS ABS 0.2 K/mcL  0.1-0.5   BASO ABS 0.0 K/mcL  0.0-0.3   FASTING STATUS UNKNOWN hrs     SODIUM 143 mmol/L  135-145   POTASSIUM 4.2 mmol/L  3.4-5.1   CHLORIDE 106 mmol/L     CARBON DIOXIDE 27 mmol/L  21-32   ANION GAP 14 mmol/L  10-20   GLUCOSE 74 mg/dl  65-99   BUN 10 mg/dl  6-20   CREATININE 0.88 mg/dl  0.51-0.95   GFR EST.AFRICAN AMER >90     eGFR results = or >90 mL/min/1.73m2 = Normal kidney function.   GFR EST.NONAFRI AMER 82     eGFR 60 - 89 mL/min/1.73m2 = Mild decrease in kidney function.   BUN/CREATININE RATIO 11  7-25   CALCIUM 8.7 mg/dl  8.4-10.2   BILIRUBIN TOTAL 0.3 mg/dl  0.2-1.0   GOT/AST 11 Units/L  <38   GPT/ALT 16 Units/L  <79   ALKALINE PHOSPHATASE 56 Units/L     TOTAL PROTEIN 6.6 g/dl  6.4-8.2   ALBUMIN 3.3 g/dl L 3.6-5.1   GLOBULIN (CALCULATED) 3.3 g/dl  2.0-4.0   A/G RATIO 1.0  1.0-2.4   FASTING STATUS UNKNOWN hrs     CHOLESTEROL 153 mg/dl  <200   Desirable             <200  Borderline High      200 to 239  High                 >=240   LDL CHOLESTEROL (CALCULATED) 95 mg/dl  <130   OPTIMAL               <100  NEAR OPTIMAL          100-129  BORDERLINE HIGH       130-159  HIGH                  160-189  VERY HIGH             >=190   HDL CHOLESTEROL 47 mg/dl L >59   Low            <40  Borderline Low 40 to 49  Near Optimal   50 to 59  Optimal        >=60   TRIGLYCERIDES 55 mg/dl  <150   Normal                   <150  Borderline High          150 to 199  High                     200 to 499  Very High                >=500   NON-HDL CHOLESTEROL 106 mg/dl     Therapeutic Target:  CHD and risk equivalents <130  Multiple risk factors    <160  0 to 1 risk factors      <190   CHOLESTEROL/HDL RATIO 3.3  <4.5     HEMOGLOBIN A1C GLYCOSYLATED [id number] 08:30AM JESSE BANKS   [May 18, 2017 10:15AM JESSE BANKS]  close f/u     Test Name Result Flag Reference   HEMOGLOBIN A1C GLYH 6.5 % H 4.5-5.6   ----DIABETIC SCREENING---  NON DIABETIC                 <5.7%  INCREASED RISK                5.7-6.4%  DIAGNOSTIC FOR DIABETES      >6.4%     ----DIABETIC CONTROL---     A1C%           eAG mg/dL  6.0            126  6.5            140  7.0            154  7.5            169  8.0            183  8.5            197  9.0            212  9.5            226  10.0           240     VITAMIN D,25 HYDROXY [id number] 08:30AM JESSE BANKS   [May 18, 2017 10:14AM JESSE BANKS]  close f/u     Test Name Result Flag Reference   VIT D,25 HYDROXY 25.8 ng/ml L 30.0-100.0   <20  ng/mL=Vitamin D deficiency  20-29  ng/mL=Vitamin D insufficiency   ng/mL=Optimal Vitamin D  >150 ng/mL=Possible toxicity        18

## 2022-09-19 NOTE — ED PROVIDER NOTE - PHYSICAL EXAMINATION
GEN:  Non-toxic appearing, non-distressed, speaking full sentences, non-diaphoretic, AAOx3  HEENT:  NCAT, neck supple, EOMI, PERRLA, sclera anicteric, no conjunctival pallor or injection, no stridor, normal voice, no tonsillar exudate, uvula midline  CV:  regular rhythm and rate, s1/s2 audible, no murmurs, rubs or gallops, peripheral pulses 2+ and symmetric  PULM:  non-labored respirations, lungs clear to auscultation bilaterally, no wheezes, crackles or rales  ABD:  non distended, non-tender, no rebound, no guarding, negative Rios's sign, bowel sounds normal, no cvat  RUE:  No deformity or laceration.  Minimal distal radius tenderness.  No anatomical snuffbox ttp.  FDP/FDS/Extensors intact in digits 2-5.  APL/FPL/EPB intact in digit 1.  AIN/PIN/U 5/5.  SILT m/r/u.  Two point discrimination intact in all digits.  Radial pulse 2+.  Brisk cap refill in all digits.  LLE:  swelling at infra-patellar area; no quadriceps tendon defect; limited rom to active extension and flexion; passive extension and flexion intact; stable valgus and varus  NEURO:  AAOx3, CN II-XII intact, motor 5/5 in upper and lower extremities bilaterally, sensation grossly intact in extremities and trunk, finger to nose testing wnl, no nystagmus, negative Romberg, no pronator drift, no gait deficit  SKIN:  warm, dry, no rash or vesicles

## 2022-09-19 NOTE — ED PROVIDER NOTE - PROGRESS NOTE DETAILS
NIDA:  ortho paged for tibial spine fracture. NIDA:  Tibial spine fracture on x-ray.  Discussed case with ortho resident Dr. Mccabe who looked at x-ray and stated patient can be discharged in knee immobilizer, non-weight bearing with crutches, and follow up outpatient.  Results explained to patient who is ok with discharge plan.

## 2022-09-19 NOTE — ED PROVIDER NOTE - CARE PROVIDER_API CALL
Steve Vargas)  Orthopedics  [address] [place] [address]  Phone: [phone]  Fax: [phone]  Follow Up Time:

## 2022-09-19 NOTE — ED PROVIDER NOTE - OBJECTIVE STATEMENT
73 yo M pmh ppm, htn, dm presents with left knee and right wrist injury.  Patient reports mechanical fall off one-step ledge two days ago.  Reports falling onto left knee and outstretched right hand.  Denies head injury or neck pain.  Since fall he reports severe left knee pain, swelling, difficulty weight bearing and ambulating.  Denies numbness, weakness, tingling, fever, chills.  Patient is not on anticoagulation or antiplatelet agents.

## 2022-09-19 NOTE — ED PROVIDER NOTE - CLINICAL SUMMARY MEDICAL DECISION MAKING FREE TEXT BOX
71 yo M pmh ppm, htn, dm presents with left knee and right wrist injury.  VSS.  Exam with swelling and tenderness over left knee.  Low c/f quadricepts tendon rupture despite limited ability to extend at knee, as there is no suprapatellar tenderness or defect.  Possible tendinous injury.  Plan for x-ray.  Patient declines analgesia at this time. Dispo pending.

## 2022-09-21 NOTE — CONSULT NOTE ADULT - SUBJECTIVE AND OBJECTIVE BOX
72y Male presents s/p [facility] fall c/o 2 days prior down steps with difficulty ambulataing.  Patient denies headstrike or LOC. Patient denies radiation of pain. Patient denies new numbness/tingling/burning in the LLE. No other bone/joint complaints. Patient is a community ambulator at baseline without assistive devices.    PAST MEDICAL & SURGICAL HISTORY:  Diabetes      Hypertension      Syncope      Obesity      Sleep apnea      S/P knee surgery        MEDICATIONS  (STANDING):    MEDICATIONS  (PRN):    Allergies    No Known Drug Allergies  Unknown Allergen - Anaphylaxis (Anaphylaxis (Severe))    Intolerances                    T(C): 36.7 (09-21-22 @ 14:33), Max: 36.7 (09-21-22 @ 14:33)  HR: 85 (09-21-22 @ 19:27) (85 - 89)  BP: 171/66 (09-21-22 @ 19:27) (141/68 - 171/66)  RR: 18 (09-21-22 @ 19:27) (18 - 18)  SpO2: 98% (09-21-22 @ 19:27) (98% - 99%)  Wt(kg): --    PE   LLE:  Skin intact; No ecchymosis/soft tissue swelling, TTP at L proximal tibia  Compartments soft; No TTP leg/ankle/foot   Able to SLR  Motor intact GS/TA/FHL/EHL  SILT L2-S1  +dp    RLE/BUE:   No bony TTP; Good ROM w/o pain; Exam Unremarkable    Imaging:  XR demonstrating nondisplaced L tibial plateau fx    72y Male with nondisplaced L tibial plateau fx    - FU CT L knee (ordered)  - FU XR L ankle/femur/hip (ordered)  - Pain control  - NWB LLE in BJKI  - No acute orthopaedic intervention  - Okay for discharge with outpatient f/u after CT and XRays completed in ED  - Ortho to sign off

## 2022-09-21 NOTE — ED PROVIDER NOTE - NS ED ATTENDING STATEMENT MOD
I have seen and examined this patient and fully participated in the care of this patient as the teaching attending.  The service was shared with the RADHA.  I reviewed and verified the documentation and independently performed the documented:

## 2022-09-21 NOTE — ED ADULT TRIAGE NOTE - CHIEF COMPLAINT QUOTE
Patient has c/o pain to his left leg after he stepped down and injured his leg. He was away from home and he came here yesterday and was told  He has a non displaced fracture to his left tibia.  Pt hasType 2 FS  Patient has c/o pain to his left leg after he stepped down and injured his leg. He was away from home and he came here yesterday and was told  He has a non displaced fracture to his left tibia. Pt unable to get around to go to bathroom or care for himself.  Pt hasType 2 FS

## 2022-09-21 NOTE — ED PROVIDER NOTE - ATTENDING CONTRIBUTION TO CARE
Patient documented  Nondisplaced, nondepressed fracture in the left lateral tibial plateau sent home with splint and followup PTED today b/c of inability to walk/perform ADLs and to obtain skilled rehab  Plan  preop labs  ortho consult probable ct of knee   analgesics/PRICE  TBA for skilled rehab

## 2022-09-21 NOTE — ED PROVIDER NOTE - CLINICAL SUMMARY MEDICAL DECISION MAKING FREE TEXT BOX
73 yo M pmh ppm, htn, dm presents to the ER 3 days s/p mechanical fall that resulted in left knee injury. Pt was seen here 9/19, imaging showed nondisplaced L tibial spine fx, placed in knee immobilizer w/crutches - returning due to difficulty ambulating. Well-appearing, NAD, vitals stable. Plan for ortho consult, social work, basic labs 73 yo Male with a PMHx of PPM, HTN, DM, presents to the ER 3 days s/p mechanical fall that resulted in left knee injury. Pt was seen here 9/19, imaging showed nondisplaced L tibial spine fx, placed in knee immobilizer w/crutches - returning due to difficulty ambulating requesting admission for rehab. Pt is well-appearing, NAD, will check labs, ortho consult, admit.

## 2022-09-21 NOTE — ED ADULT NURSE NOTE - CHIEF COMPLAINT QUOTE
Patient has c/o pain to his left leg after he stepped down and injured his leg. He was away from home and he came here yesterday and was told  He has a non displaced fracture to his left tibia. Pt unable to get around to go to bathroom or care for himself.  Pt hasType 2 FS

## 2022-09-21 NOTE — ED PROVIDER NOTE - OBJECTIVE STATEMENT
71 yo M pmh ppm, htn, dm presents to the ER 3 days s/p mechanical fall that resulted in left knee injury. Pt was seen in ED 9/19 and had imaging done showing L tibial spinal fx and was placed in a knee immobilizer with crutches. Discussed outpatient follow up with ortho prior to discharge and pt agreed. Pt returning today because he is unable to ambulate, wants to discuss rehab options. Denies worsening pain, numbness/weakness in extremities, fever, chills, chest pain, SOB. 73 y/o Male with a PMHx PPM, HTN, DM, presents to the ER 3 days s/p mechanical fall that resulted in left knee injury. Pt was seen in ED 9/19 and had imaging done showing L tibial spinal fx and was placed in a knee immobilizer with crutches. Discussed outpatient follow up with ortho prior to discharge and pt agreed. Pt returning today because he is unable to ambulate, wants to discuss rehab options. Denies worsening pain, numbness/weakness in extremities, fever, chills, chest pain, SOB. 71 y/o Male with a PMHx PPM, HTN, DM, presents to the ER 3 days s/p mechanical fall that resulted in left knee injury/fracture. Pt was seen in ER on 9/19 and had imaging done showing L tibial spinal fx and was placed in a knee immobilizer with crutches. Pt returning today because he is unable to ambulate and cannot take care of himself at home and wants to be placed in Rehab. Pt denies worsening pain, numbness/weakness in extremities, fever, chills, chest pain, SOB, new fall.

## 2022-09-22 NOTE — H&P ADULT - NSHPPHYSICALEXAM_GEN_ALL_CORE
Vital Signs Last 24 Hrs  T(C): 36.8 (22 Sep 2022 06:47), Max: 37 (21 Sep 2022 22:53)  T(F): 98.3 (22 Sep 2022 06:47), Max: 98.6 (21 Sep 2022 22:53)  HR: 57 (22 Sep 2022 06:47) (57 - 89)  BP: 113/64 (22 Sep 2022 06:47) (113/64 - 171/66)  BP(mean): --  RR: 18 (22 Sep 2022 06:47) (16 - 18)  SpO2: 96% (22 Sep 2022 06:47) (96% - 99%)    Parameters below as of 22 Sep 2022 06:47  Patient On (Oxygen Delivery Method): room air        PHYSICAL EXAM:  GENERAL: No Acute Distress  EYES: conjunctiva and sclera clear  ENMT: Moist mucous membranes   NECK: Supple  PULMONARY: Clear to auscultation bilaterally  CARDIAC: Regular rate and rhythm; No murmurs, rubs, or gallops  GASTROINTESTINAL: Abdomen soft, Nontender, Nondistended; Bowel sounds normal  EXTREMITIES:   No clubbing, cyanosis, or pedal edema  PSYCH: Normal Affect, Normal Behavior  NEUROLOGY:   - Mental status A&O x 3,  SKIN: No rashes or lesions  MUSCULOSKELETAL: L knee wrapped

## 2022-09-22 NOTE — PHYSICAL THERAPY INITIAL EVALUATION ADULT - NSPTDISCHREC_GEN_A_CORE
anticipated discharge to rehab facility to address current functional limitation to optimize safety to allow pt. to reach their optimal level of function./Sub-acute Rehab

## 2022-09-22 NOTE — DISCHARGE NOTE PROVIDER - HOSPITAL COURSE
73 y/o M with HTN, HLD, DM type 2, RADHA on CPAP, and transient CHB s/p PPM p/w L leg pain and difficulty performing ADL's.     · Tibial plateau fracture.   ·  Plan: - reviewed orthopedics consult  - CT and Xray imaging obtained per orthopedics, ortho team to follow up outpatient  - PT maggie noted, patient recommended for [facility]  - CM notified, placement in progress  - Tylenol for pain control.      · Essential hypertension.   ·  Plan: - continue hydralazine, amlodipine, and HCTZ.      · Type 2 diabetes mellitus.   ·  Plan: - hold metformin, and Ozempic  - Lispro sliding scale.    ·  Anxiety.   ·  Plan: - continue Xanax prn and sertraline.      ·  RADHA on CPAP.   ·  Plan: - continue CPAP at night  - patient brought in own device.      · Need for prophylactic measure.   ·  Plan: DVT: Lovenox  Diet: CC  Dispo: [facility] pending placement.    Case discussed with Dr. Esteban and patient is medically stable for discharge to [facility] today at [facility]. Follow-up with Orthopedics as outpatient.    71 y/o M with HTN, HLD, DM type 2, RADHA on CPAP, and transient CHB s/p PPM p/w L leg pain and difficulty performing ADL's.     · Tibial plateau fracture.   ·  Plan: - reviewed orthopedics consult  - CT and Xray imaging obtained per orthopedics, ortho team to follow up outpatient  - PT maggie noted, patient recommended for [facility]  - CM notified, placement in progress  - Tylenol for pain control.      · Essential hypertension.   ·  Plan: - continue hydralazine, amlodipine, and HCTZ.      · Type 2 diabetes mellitus.   ·  Plan: - hold metformin, and Ozempic  - Lispro sliding scale.    ·  Anxiety.   ·  Plan: - continue Xanax prn and sertraline.      ·  RADHA on CPAP.   ·  Plan: - continue CPAP at night  - patient brought in own device.      · Need for prophylactic measure.   ·  Plan: DVT: Lovenox  Diet: CC  Dispo: [facility] pending placement.    Case discussed with Dr. Esteban and patient is medically stable for discharge to [facility] today at [facility]. Follow-up with Orthopedics as outpatient within 1 week with Dr. Steve Vargas.    71 y/o M with HTN, HLD, DM type 2, RADHA on CPAP, and transient CHB s/p PPM p/w L leg pain and difficulty performing ADL's.     · Tibial plateau fracture.   ·  Plan: - reviewed orthopedics consult  - CT and Xray imaging obtained per orthopedics, ortho team to follow up outpatient  - PT maggie noted, patient recommended for [facility]  - CM notified, placement in progress  - Tylenol for pain control.    · Essential hypertension.   ·  Plan: - continue hydralazine, amlodipine, and HCTZ.    · Type 2 diabetes mellitus.   ·  Plan: - hold metformin, and Ozempic  - Lispro sliding scale.    ·  Anxiety.   ·  Plan: - continue Xanax prn and sertraline.    ·  RADHA on CPAP.   ·  Plan: - continue CPAP at night  - patient brought in own device.    · Need for prophylactic measure.   ·  Plan: DVT: Lovenox  Diet: CC  Dispo: [facility] pending placement.    Case discussed with Dr. Esteban and patient is medically stable for discharge to [facility] today at [facility]. Follow-up with Orthopedics as outpatient within 1 week with Dr. Steve Vargas.

## 2022-09-22 NOTE — PROGRESS NOTE ADULT - PROBLEM SELECTOR PLAN 1
- reviewed orthopedics consult  - CT and Xray imaging obtained per orthopedics, ortho team to follow up outpatient  - PT eval noted, patient recommended for [facility]  - CM notified, placement in progress  - Tylenol for pain control

## 2022-09-22 NOTE — H&P ADULT - NSHPREVIEWOFSYSTEMS_GEN_ALL_CORE
Review of Systems:   CONSTITUTIONAL: No fever or chills  EYES: No eye pain, visual disturbances, or discharge  ENMT:  No difficulty hearing, no throat pain  NECK: No pain or stiffness  RESPIRATORY: No cough, No shortness of breath  CARDIOVASCULAR: No chest pain, palpitations, dizziness, or leg swelling  GASTROINTESTINAL: No abdominal pain, nausea, vomiting or diarrhea  GENITOURINARY: No dysuria, or hematuria  NEUROLOGICAL: No headaches, weakness, or numbness  SKIN: No rashes, or lesions   LYMPH NODES: No enlarged glands  ENDOCRINE: No heat or cold intolerance  MUSCULOSKELETAL: L knee pain  PSYCHIATRIC: No depression or anxiety  HEME/LYMPH: No easy bruising, or bleeding  ALLERGY AND IMMUNOLOGIC: No hives or eczema

## 2022-09-22 NOTE — DISCHARGE NOTE PROVIDER - ATTENDING DISCHARGE PHYSICAL EXAMINATION:
Patient seen and examined at bedside on day of discharge (9/22/22). Patient without any acute complaints. Noted with immobilizer cast. CT of LLE and XRays performed in ED per orthopedics recs. No acute changes noted from prior. Patient with non-displaced fracture, already in cast. Per orthopedics, no plan for acute surgical interventions. Patient evaluated by physical therapy. Recommended for [facility]. Patient accepted to [facility] today. Plan for transfer to [facility] for further rehabilitation. Patient to follow up with Orthopedic Surgery (Dr. Steve Vargas) within 1 week of discharge for further management.    PHYSICAL EXAM:  GENERAL: NAD, lying in bed comfortably  HEENT: NC/AT, EOMI, PERRL  NECK: Supple, No JVD  CHEST/LUNG: CTAB, no increased WOB  HEART: RRR, no m/r/g  ABDOMEN: soft, NT, ND, BS+  EXTREMITIES:  2+ peripheral pulses, LLE in cast and immobilizer.  NERVOUS SYSTEM:  A&Ox3, no focal deficits  MSK: FROM all 4 extremities, full and equal strength  SKIN: No rashes or lesions

## 2022-09-22 NOTE — PROGRESS NOTE ADULT - SUBJECTIVE AND OBJECTIVE BOX
[facility] Department of Hospital Medicine  Stacia Esteban MD  Available on MS Teams  Pager: [phone]    Patient is a 72y old  Male who presents with a chief complaint of Leg pain (22 Sep 2022 06:46)    OVERNIGHT EVENTS: No acute events overnight.    SUBJECTIVE: Pt seen and examined at bedside this morning. Endorses some mild pain over his LLE but otherwise denies any acute complaints. Patient denies any dizziness, CP, SOB, fevers, nausea, vomiting, or abdominal pain.    ADDITIONAL REVIEW OF SYSTEMS:    MEDICATIONS  (STANDING):  amLODIPine   Tablet 10 milliGRAM(s) Oral daily  atorvastatin 80 milliGRAM(s) Oral at bedtime  dextrose 5%. 1000 milliLiter(s) (100 mL/Hr) IV Continuous <Continuous>  dextrose 5%. 1000 milliLiter(s) (50 mL/Hr) IV Continuous <Continuous>  dextrose 50% Injectable 25 Gram(s) IV Push once  dextrose 50% Injectable 12.5 Gram(s) IV Push once  dextrose 50% Injectable 25 Gram(s) IV Push once  enoxaparin Injectable 40 milliGRAM(s) SubCutaneous every 24 hours  glucagon  Injectable 1 milliGRAM(s) IntraMuscular once  hydrALAZINE 25 milliGRAM(s) Oral three times a day  hydrochlorothiazide 25 milliGRAM(s) Oral daily  insulin lispro (ADMELOG) corrective regimen sliding scale   SubCutaneous three times a day before meals  insulin lispro (ADMELOG) corrective regimen sliding scale   SubCutaneous at bedtime  metoprolol succinate ER 50 milliGRAM(s) Oral daily  pantoprazole    Tablet 40 milliGRAM(s) Oral before breakfast  sertraline 100 milliGRAM(s) Oral daily    MEDICATIONS  (PRN):  acetaminophen     Tablet .. 650 milliGRAM(s) Oral every 6 hours PRN Temp greater or equal to 38C (100.4F), Mild Pain (1 - 3), Moderate Pain (4 - 6), Severe Pain (7 - 10)  ALPRAZolam 1 milliGRAM(s) Oral every 8 hours PRN anxiety  dextrose Oral Gel 15 Gram(s) Oral once PRN Blood Glucose LESS THAN 70 milliGRAM(s)/deciliter  melatonin 3 milliGRAM(s) Oral at bedtime PRN Insomnia    CAPILLARY BLOOD GLUCOSE    POCT Blood Glucose.: 141 mg/dL (22 Sep 2022 12:48)  POCT Blood Glucose.: 165 mg/dL (22 Sep 2022 09:22)  POCT Blood Glucose.: 147 mg/dL (22 Sep 2022 03:12)    I&O's Summary    PHYSICAL EXAM:  Vital Signs Last 24 Hrs  T(C): 36.9 (22 Sep 2022 11:55), Max: 37 (21 Sep 2022 22:53)  T(F): 98.5 (22 Sep 2022 11:55), Max: 98.6 (21 Sep 2022 22:53)  HR: 67 (22 Sep 2022 11:55) (57 - 85)  BP: 121/51 (22 Sep 2022 11:55) (113/64 - 171/66)  BP(mean): --  RR: 18 (22 Sep 2022 11:55) (16 - 18)  SpO2: 98% (22 Sep 2022 11:55) (96% - 99%)    Parameters below as of 22 Sep 2022 11:55  Patient On (Oxygen Delivery Method): room air    CONSTITUTIONAL: NAD, well-developed  HEAD: Normocephalic, atraumatic  EYES: EOMI, PERRL  ENT: no rhinorrhea, no pharyngeal erythema  RESPIRATORY: No increased work of breathing, CTAB, no wheezes or crackles appreciated  CARDIOVASCULAR: RRR, S1 and S2 present, no m/r/g  ABDOMEN: soft, NT, ND, bowel sounds present  EXTREMITIES: LLE in cast with immobilizer  MUSCULOSKELETAL: no joint swelling, no tenderness to palpation  NEURO: A&Ox3, moving all extremities    LABS:                        14.1   8.99  )-----------( 253      ( 21 Sep 2022 19:20 )             43.1     09-21    139  |  96<L>  |  32<H>  ----------------------------<  155<H>  3.1<L>   |  26  |  1.13    Ca    9.7      21 Sep 2022 19:20    TPro  8.1  /  Alb  4.4  /  TBili  1.0  /  DBili  x   /  AST  24  /  ALT  18  /  AlkPhos  105  09-21    PT/INR - ( 21 Sep 2022 19:20 )   PT: 14.7 sec;   INR: 1.26 ratio      RADIOLOGY & ADDITIONAL TESTS:  < from: CT Knee No Cont, Left (09.22.22 @ 01:16) >  IMPRESSION:  Nondisplaced, nondepressed fracture in the left lateral tibial plateau   extending to the intercondylar notch.  Small left lipohemarthrosis.    < from: Xray Femur 2 Views, Left (09.22.22 @ 10:11) >    IMPRESSION:  Rigid brace currently overlies left lower extremity from below distal   thigh.    Previously demonstrated nondisplaced proximal tibial intercondylar   eminence region fracture not well demonstrated radiographically. Intact   unremarkable appearing visualized upper end of a right femoral IM   paul/nail with proximal helical blade compression screw.Chronic   posttraumatic cortical contour deformities in right trochanteric region.   No dislocations or additional fractures.    Intact pelvic and obturator rings and symmetrically aligned and spaced SI   joints and pubic symphysis.    Preserved bilateral hip joint spaces and no gross radiographic evidence   for AVN.    Generalized osteopenia otherwise no discrete lytic or blastic lesions.    Vascular calcifications.    Results Reviewed:   Imaging Personally Reviewed:  Electrocardiogram Personally Reviewed:    COORDINATION OF CARE:  Care Discussed with Consultants/Other Providers [Y/N]:  Prior or Outpatient Records Reviewed [Y/N]:

## 2022-09-22 NOTE — PHYSICAL THERAPY INITIAL EVALUATION ADULT - PERTINENT HX OF CURRENT PROBLEM, REHAB EVAL
73 y/o M with HTN, HLD, DM type 2, RADHA on CPAP, and transient CHB s/p PPM p/w L leg pain.  Pt presented to ED 9/19 after falling and landing on L knee.  He was diagnosed with L tibial plateau fracture, and placed in immobilizer.  He was discharged home from ED, but since discharge, has had difficulty performing ADL's at home.  Pt otherwise feels well without any new symptoms.  [place] mis[place] while going down a step, and fell onto knee.

## 2022-09-22 NOTE — DISCHARGE NOTE PROVIDER - NSDCCPCAREPLAN_GEN_ALL_CORE_FT
PRINCIPAL DISCHARGE DIAGNOSIS  Diagnosis: Tibial plateau fracture  Assessment and Plan of Treatment: Continue Physical Therapy at [facility].   Follow-up with Orthopedics as outpatient.      SECONDARY DISCHARGE DIAGNOSES  Diagnosis: Type 2 diabetes mellitus  Assessment and Plan of Treatment: Continue current medications.   Monitor your Fingersticks    Diagnosis: Anxiety  Assessment and Plan of Treatment: Continue Xanax.    Diagnosis: RADHA on CPAP  Assessment and Plan of Treatment: Continue to monitor.    Diagnosis: Essential hypertension  Assessment and Plan of Treatment: Continue current medications.   Monitor your blood pressure.     PRINCIPAL DISCHARGE DIAGNOSIS  Diagnosis: Tibial plateau fracture  Assessment and Plan of Treatment: Continue Physical Therapy at [facility].   Follow-up with Orthopedics WITHIN 1 week Dr. Steve Carvajal! Please have [facility] make an appointment to take you for your appointment within 1 week!!      SECONDARY DISCHARGE DIAGNOSES  Diagnosis: Type 2 diabetes mellitus  Assessment and Plan of Treatment: Continue current medications.   Monitor your Fingersticks    Diagnosis: Anxiety  Assessment and Plan of Treatment: Continue Xanax.    Diagnosis: RADHA on CPAP  Assessment and Plan of Treatment: Continue to monitor.    Diagnosis: Essential hypertension  Assessment and Plan of Treatment: Continue current medications.   Monitor your blood pressure.     PRINCIPAL DISCHARGE DIAGNOSIS  Diagnosis: Tibial plateau fracture  Assessment and Plan of Treatment: Continue Physical Therapy at [facility].   Follow-up with Orthopedics WITHIN 1 week Dr. Steve Carvajal! Please have [facility] make an appointment to take you for your appointment within 1 week!!      SECONDARY DISCHARGE DIAGNOSES  Diagnosis: Type 2 diabetes mellitus  Assessment and Plan of Treatment: Continue current medications.   Monitor your Fingersticks    Diagnosis: Anxiety  Assessment and Plan of Treatment: Continue Xanax.    Diagnosis: RADHA on CPAP  Assessment and Plan of Treatment: Continue to monitor.  Continue CPAP at night.    Diagnosis: Essential hypertension  Assessment and Plan of Treatment: Continue current medications.   Monitor your blood pressure.

## 2022-09-22 NOTE — DISCHARGE NOTE PROVIDER - PROVIDER TOKENS
PROVIDER:[TOKEN:[[id number]]],FREE:[LAST:[Follow-up with],PHONE:[(   )    -],FAX:[(   )    -],ADDRESS:[Dr. Steve Carvajal in the office within 1 week. Please have [facility] make an appointment with Dr. Carvajal within 1 week.]] PROVIDER:[TOKEN:[[id number]]],PROVIDER:[TOKEN:[[id number]],SCHEDULEDAPPT:[10/14/2022],ESTABLISHEDPATIENT:[T]]

## 2022-09-22 NOTE — H&P ADULT - PROBLEM SELECTOR PLAN 1
- reviewed orthopedics consult  - will need PT eval and likely placement to [facility]  - Tylenol for pain ctrl

## 2022-09-22 NOTE — DISCHARGE NOTE PROVIDER - CARE PROVIDERS DIRECT ADDRESSES
,DirectAddress_Unknown,DirectAddress_Unknown ,DirectAddress_Unknown,[email or web address]

## 2022-09-22 NOTE — DISCHARGE NOTE PROVIDER - NSDCFUADDAPPT_GEN_ALL_CORE_FT
Please be sure to follow up with Dr. Steve Vargas (Orthopedics) within 1 week of discharge. Please ask your rehabilitation facility to schedule this appointment on your behalf and schedule proper transportation.

## 2022-09-22 NOTE — H&P ADULT - HISTORY OF PRESENT ILLNESS
73 y/o M with HTN, HLD, DM type 2, RADHA on CPAP, and transient CHB s/p PPM p/w L leg pain.  Pt presented to ED 9/19 after falling and landing on L knee.  He was diagnosed with L tibial plateau fracture, and placed in immobilizer.  He was discharged home from ED, but since discharge, has had difficulty performing ADL's at home.  Pt otherwise feels well without any new symptoms.  [place] and mis[place]pped while going down a step, and fell onto knee.     In the ED, pt was evaluated by orthopedics.  He was given KCl for K+ of 3.1.

## 2022-09-22 NOTE — PHYSICAL THERAPY INITIAL EVALUATION ADULT - MANUAL MUSCLE TESTING RESULTS, REHAB EVAL
Bilateral UE and right LE at least 3+/5 throughout. Left LE not assessed due to fracture and patient in knee immobilizer

## 2022-09-22 NOTE — PHYSICAL THERAPY INITIAL EVALUATION ADULT - ACTIVE RANGE OF MOTION EXAMINATION, REHAB EVAL
left LE in knee immobilizer/bilateral upper extremity Active ROM was WFL (within functional limits)/Right LE Active ROM was WFL (within functional limits)

## 2022-09-22 NOTE — DISCHARGE NOTE PROVIDER - NSDCFUSCHEDAPPT_GEN_ALL_CORE_FT
Clinton Oliveros  [facility]  Puled [address] [facility]  Scheduled Appointment: 10/14/2022

## 2022-09-22 NOTE — ED ADULT NURSE REASSESSMENT NOTE - NS ED NURSE REASSESS COMMENT FT1
Patient awake and alert, resting comfortably at this time.  Left leg brace in place.
Pt a&ox4, denies pain at this time. Respirations are even and unlabored, no signs of respiratory distress.

## 2022-09-22 NOTE — H&P ADULT - ASSESSMENT
73 y/o M with HTN, HLD, DM type 2, RADHA on CPAP, and transient CHB s/p PPM p/w L leg pain and difficulty performing ADL's.

## 2022-09-22 NOTE — H&P ADULT - NSHPLABSRESULTS_GEN_ALL_CORE
09-21    139  |  96<L>  |  32<H>  ----------------------------<  155<H>  3.1<L>   |  26  |  1.13    Ca    9.7      21 Sep 2022 19:20    TPro  8.1  /  Alb  4.4  /  TBili  1.0  /  DBili  x   /  AST  24  /  ALT  18  /  AlkPhos  105  09-21                            14.1   8.99  )-----------( 253      ( 21 Sep 2022 19:20 )             43.1               PT/INR - ( 21 Sep 2022 19:20 )   PT: 14.7 sec;   INR: 1.26 ratio             < from: CT Knee No Cont, Left (09.22.22 @ 01:16) >    Nondisplaced, nondepressed fracture in the left lateral tibial plateau   extending to the intercondylar notch.  Small left lipohemarthrosis.    < end of copied text >

## 2022-09-22 NOTE — PHYSICAL THERAPY INITIAL EVALUATION ADULT - ADDITIONAL COMMENTS
Patient lives with wife in private home, + 3 steps to enter and no steps to bedroom/bathroom. Patient was independent in all ADL prior to admission. Patient was not using an assistive device prior to admission.

## 2022-09-22 NOTE — DISCHARGE NOTE PROVIDER - CARE PROVIDER_API CALL
Steve Carvajal)  Orthopedics  [address] [address]  Phone: [phone]  Fax: [phone]  Follow Up Time:     Follow-up with,   Dr. Steve Carvajal in the office within 1 week. Please have [facility] make an appointment with Dr. Carvajal within 1 week.  Phone: (   )    -  Fax: (   )    -  Follow Up Time:    Steve Vargas)  Orthopedics  [address] [address]  Phone: [phone]  Fax: [phone]  Follow Up Time:     Clinton Oliveros  INTERNAL MEDICINE  [address] [facility], [address]  [place] [address]  Phone: [phone]  Fax: [phone]  Established Patient  Scheduled Appointment: 10/14/2022

## 2022-09-22 NOTE — DISCHARGE NOTE PROVIDER - NSDCMRMEDTOKEN_GEN_ALL_CORE_FT
amLODIPine 10 mg oral tablet: 1 tab(s) orally once a day  Crestor 20 mg oral tablet: 1 tab(s) orally once a day (at bedtime)  hydrALAZINE 25 mg oral tablet: 1 tab(s) orally 3 times a day  hydroCHLOROthiazide 25 mg oral tablet: 1 tab(s) orally once a day  metFORMIN 500 mg oral tablet: 1 tab(s) orally 2 times a day  omeprazole 20 mg oral delayed release capsule: 1 cap(s) orally once a day  Ozempic 2 mg/1.5 mL (1 mg dose) subcutaneous solution:   sertraline 100 mg oral tablet: 1 tab(s) orally once a day  Toprol-XL 50 mg oral tablet, extended release: 1 tab(s) orally once a day  Xanax 1 mg oral tablet: 1 tab(s) orally every 8 hours, As Needed   acetaminophen 325 mg oral tablet: 2 tab(s) orally every 6 hours, As needed, Temp greater or equal to 38C (100.4F), Mild Pain (1 - 3), Moderate Pain (4 - 6), Severe Pain (7 - 10)  amLODIPine 10 mg oral tablet: 1 tab(s) orally once a day  Crestor 20 mg oral tablet: 1 tab(s) orally once a day (at bedtime)  hydrALAZINE 25 mg oral tablet: 1 tab(s) orally 3 times a day  hydroCHLOROthiazide 25 mg oral tablet: 1 tab(s) orally once a day  metFORMIN 500 mg oral tablet: 1 tab(s) orally 2 times a day  omeprazole 20 mg oral delayed release capsule: 1 cap(s) orally once a day  Ozempic 2 mg/1.5 mL (1 mg dose) subcutaneous solution:   sertraline 100 mg oral tablet: 1 tab(s) orally once a day  Toprol-XL 50 mg oral tablet, extended release: 1 tab(s) orally once a day  Xanax 1 mg oral tablet: 1 tab(s) orally every 8 hours, As Needed

## 2022-09-22 NOTE — PROGRESS NOTE ADULT - ASSESSMENT
71 y/o M with HTN, HLD, DM type 2, RADHA on CPAP, and transient CHB s/p PPM p/w L leg pain and difficulty performing ADL's.

## 2022-10-14 ENCOUNTER — APPOINTMENT (OUTPATIENT)
Dept: PULMONOLOGY | Facility: CLINIC | Age: 72
End: 2022-10-14

## 2022-12-15 NOTE — REASON FOR VISIT
[FreeTextEntry1] : Assessment:\par #Non-ischemic cardiomyopathy, with recovering EF (LVEF 54% 9/2022)\par - Presented in HF 6/2022 with LVEF <20 % \par - Euvolemic, well compensated\par - Compliant with medications\par - Cardiac MRI with concern for sarcoidosis, PET scan negative for sarcoid\par #BMI 44\par #Snoring\par \par Plan:\par - Cont Entresto  mg BID, Farxiga 10 mg daily, Spironolactone 25 mg daily, Carvedilol 25 mg BID\par - Dietician f/u for wt loss\par - F/u with HF team on 3/2023\par - Referral to pulmonary for EDDIE evaluation\par - Return to clinic in 6 months \par 
[Initial Consultation] : an initial consultation for

## 2023-04-12 NOTE — ED ADULT TRIAGE NOTE - BP NONINVASIVE SYSTOLIC (MM HG)
----- Message from ANGELY Whiteside sent at 4/12/2023  3:53 PM CDT -----  Sodium and potassium are normal. Kidney functions stable. Nt pro bnp is stable over the last two months. Mag is improving.   No changes as long as she feels stable.recheck one month.    141

## 2024-04-10 NOTE — ED ADULT TRIAGE NOTE - ESI TRIAGE ACUITY LEVEL, MLM
2 Discontinue Regimen: Mupirocin ointment.\\nOTC hydrocortisone cream. Samples Given: Opzelua cream 5gram tube Render In Strict Bullet Format?: No Plan: Patient will return if not resolved or if condition worsens. Initiate Treatment: metronidazole 0.75 % topical cream Qd\\nQuantity: 45.0 g  Days Supply: 30\\nSig: Apply thin layer(1g) to affected area around nose once daily the morning.\\n\\nketoconazole 2 % shampoo TIW\\nQuantity: 120.0 ml  Days Supply: 30\\nSig: Apply to affected area on face TIW; rinse after five minutes\\n\\n\\nOpzelura cream-sample given in house.\\nPatient to apply to affected area around nose at night. Detail Level: Zone

## 2024-05-09 NOTE — ED ADULT NURSE NOTE - IN THE PAST 12 MONTHS HAVE YOU USED DRUGS OTHER THAN THOSE REQUIRED FOR MEDICAL REASON?
Addended by: KELLY SLATER on: 7/16/2020 11:05 AM     Modules accepted: Orders    
No
No. JEET screening performed.  STOP BANG Legend: 0-2 = LOW Risk; 3-4 = INTERMEDIATE Risk; 5-8 = HIGH Risk

## 2025-08-02 NOTE — ED PROVIDER NOTE - NPI NUMBER (FOR SYSADMIN USE ONLY) :
Spoke with pt by phone this evening  for discussion of labs completed earlier today: increase in  (93 on 7/2); Cr 5.17 (4.51 on 7/2); bicarb level 16. Discussed need for further eval outside of clinic setting.   Pt reports feeling well since yesterday when seen in [facility] for modality education and CKD follow up. No changes noted. Denies any symptoms of infection, bleeding, urine retention or any c/o other than fatigue from an outing earlier today. Pt will present to the ED over the weekend for any changes in condition or by Mon morning for further eval.  Will begin today 1 tsp baking soda in beverage of choice daily.      [[id number]]